# Patient Record
Sex: MALE | Race: WHITE | NOT HISPANIC OR LATINO | Employment: OTHER | URBAN - METROPOLITAN AREA
[De-identification: names, ages, dates, MRNs, and addresses within clinical notes are randomized per-mention and may not be internally consistent; named-entity substitution may affect disease eponyms.]

---

## 2017-02-02 ENCOUNTER — PRE VISIT (OUTPATIENT)
Dept: CARDIOLOGY | Facility: CLINIC | Age: 62
End: 2017-02-02

## 2017-02-21 ENCOUNTER — TELEPHONE (OUTPATIENT)
Dept: CARDIOLOGY | Facility: CLINIC | Age: 62
End: 2017-02-21

## 2017-02-21 ENCOUNTER — HOSPITAL ENCOUNTER (OUTPATIENT)
Dept: CARDIOLOGY | Facility: CLINIC | Age: 62
Discharge: HOME | End: 2017-02-21
Attending: INTERNAL MEDICINE | Admitting: INTERNAL MEDICINE
Payer: COMMERCIAL

## 2017-02-21 ENCOUNTER — OFFICE VISIT (OUTPATIENT)
Dept: CARDIOLOGY | Facility: CLINIC | Age: 62
End: 2017-02-21
Attending: INTERNAL MEDICINE
Payer: COMMERCIAL

## 2017-02-21 VITALS
DIASTOLIC BLOOD PRESSURE: 86 MMHG | WEIGHT: 203 LBS | BODY MASS INDEX: 27.5 KG/M2 | SYSTOLIC BLOOD PRESSURE: 132 MMHG | HEIGHT: 72 IN | HEART RATE: 68 BPM

## 2017-02-21 DIAGNOSIS — I05.9 MITRAL VALVE DISORDER: Primary | ICD-10-CM

## 2017-02-21 DIAGNOSIS — I48.0 PAROXYSMAL ATRIAL FIBRILLATION (H): ICD-10-CM

## 2017-02-21 DIAGNOSIS — I34.1 MVP (MITRAL VALVE PROLAPSE): ICD-10-CM

## 2017-02-21 DIAGNOSIS — I10 BENIGN ESSENTIAL HYPERTENSION: ICD-10-CM

## 2017-02-21 DIAGNOSIS — Q21.12 PFO (PATENT FORAMEN OVALE): ICD-10-CM

## 2017-02-21 DIAGNOSIS — I05.9 MITRAL VALVE DISORDER: ICD-10-CM

## 2017-02-21 LAB
ANION GAP SERPL CALCULATED.3IONS-SCNC: 12.2 MMOL/L (ref 6–17)
BUN SERPL-MCNC: 11 MG/DL (ref 7–30)
CALCIUM SERPL-MCNC: 9.3 MG/DL (ref 8.5–10.5)
CHLORIDE SERPL-SCNC: 105 MMOL/L (ref 98–107)
CO2 SERPL-SCNC: 31 MMOL/L (ref 23–29)
CREAT SERPL-MCNC: 1.22 MG/DL (ref 0.7–1.3)
GFR SERPL CREATININE-BSD FRML MDRD: 60 ML/MIN/1.7M2
GLUCOSE SERPL-MCNC: 89 MG/DL (ref 70–105)
POTASSIUM SERPL-SCNC: 4.2 MMOL/L (ref 3.5–5.1)
SODIUM SERPL-SCNC: 144 MMOL/L (ref 136–145)

## 2017-02-21 PROCEDURE — 36415 COLL VENOUS BLD VENIPUNCTURE: CPT | Performed by: INTERNAL MEDICINE

## 2017-02-21 PROCEDURE — 93306 TTE W/DOPPLER COMPLETE: CPT

## 2017-02-21 PROCEDURE — 80048 BASIC METABOLIC PNL TOTAL CA: CPT | Performed by: INTERNAL MEDICINE

## 2017-02-21 PROCEDURE — 99214 OFFICE O/P EST MOD 30 MIN: CPT | Performed by: INTERNAL MEDICINE

## 2017-02-21 PROCEDURE — 93306 TTE W/DOPPLER COMPLETE: CPT | Mod: 26 | Performed by: INTERNAL MEDICINE

## 2017-02-21 RX ORDER — LISINOPRIL 5 MG/1
5 TABLET ORAL DAILY
Qty: 90 TABLET | Refills: 3 | Status: SHIPPED | OUTPATIENT
Start: 2017-02-21 | End: 2018-03-05

## 2017-02-21 RX ORDER — METOPROLOL SUCCINATE 25 MG/1
25 TABLET, EXTENDED RELEASE ORAL DAILY
Qty: 90 TABLET | Refills: 3 | Status: SHIPPED | OUTPATIENT
Start: 2017-02-21 | End: 2018-03-05

## 2017-02-21 NOTE — LETTER
2/21/2017    Jose Riley MD  [address] [address]     RE: Nicholas FAVIAN Gongora       Dear Colleague,    I had the pleasure of seeing Nicholas Carrasquillofabian in the [facility].    Mr. Gongora is a very nice 62-year-old gentleman with past medical history significant for schizophrenia, mitral valve prolapse, ultimately leading to mitral valve repair with a St. Mars ring in 04/2001 by Dr. Tato Mckeon.  He presented with new onset atrial fibrillation.  In addition to his mitral valve repair with a 22 mm ring, he underwent bilateral maze procedure and a left atrial appendage removal and repair of a patent foramen ovale.      Mr. Gongora returns to clinic stating that he thinks he is doing quite well.  He has no chest, arm, neck, jaw or shoulder discomfort, no dyspnea on exertion, orthopnea or PND, no palpitations, lightheadedness, dizziness, syncope or near-syncope.  He notes no problems or side effects with his current medical regimen.      Over the last 2-3 years, he has done better as he was homeless prior to that and now he is living with his sister.  He now states he is getting his detention Social Security checks, and this is helping him out financially, although he states it is still only $877 a month.      Outpatient Encounter Prescriptions as of 2/21/2017   Medication Sig Dispense Refill     Aspirin-Caffeine (ANACIN PO) Take 800 mg by mouth 2 times daily       lisinopril (PRINIVIL/ZESTRIL) 5 MG tablet Take 1 tablet (5 mg) by mouth daily 90 tablet 3     metoprolol (TOPROL-XL) 25 MG 24 hr tablet Take 1 tablet (25 mg) by mouth daily 90 tablet 3     ziprasidone (GEODON) 80 MG capsule Take 80 mg by mouth daily       [DISCONTINUED] lisinopril (PRINIVIL/ZESTRIL) 5 MG tablet Take 1 tablet (5 mg) by mouth daily 90 tablet 0     [DISCONTINUED] metoprolol (TOPROL-XL) 25 MG 24 hr tablet Take 1 tablet (25 mg) by mouth daily 90 tablet 0      [DISCONTINUED] aspirin 81 MG tablet Take 81 mg by mouth daily       [DISCONTINUED] acetaminophen (TYLENOL) 500 MG tablet Take 500-1,000 mg by mouth every 6 hours as needed for mild pain       No facility-administered encounter medications on file as of 2/21/2017.      ASSESSMENT AND PLAN:  Nicholas appears to be doing quite well from a cardiac standpoint without clinical evidence of ischemia, heart failure or significant arrhythmia.      Echocardiogram shows his valve to be functioning well.  Mean gradient across the valve is only 2 mmHg.  His ascending aorta is mildly dilated.  His ejection fraction is 55%-60%.      To his knowledge, he has had no reoccurrence of atrial fibrillation.  Rhythm today in clinic appears to be quite regular with occasional premature beats.  His echocardiogram does not commit to what rhythm he is in.  I will review the echocardiogram.      I will continue his medical regimen as is.  Blood pressure is well controlled at 132/86 with a pulse of 68.      Review of the chart shows he has not had a basic metabolic profile checked in 4 years.  As he is on lisinopril, I will send off a basic metabolic profile and will follow up on that.  Otherwise, I will have him return in 1 year.  If he should have any problems, I would be glad to see him sooner.  Thank you for allowing me to participate in his care.     Again, thank you for allowing me to participate in the care of your patient.      Sincerely,    Vaughn Bello MD     [facility]

## 2017-02-21 NOTE — TELEPHONE ENCOUNTER
Per Patient okay to leave lab results message on answering machine. BMP done post OV today reviewed with Dr. Bello and patient called and message left. Patient will call back with any questions or concerns.

## 2017-02-21 NOTE — PROGRESS NOTES
HPI and Plan:   See dictation    Orders Placed This Encounter   Procedures     Basic metabolic panel     Basic metabolic panel     Basic metabolic panel     Follow-Up with Cardiac Advanced Practice Provider     Follow-Up with Cardiologist     Echocardiogram     Echocardiogram       Orders Placed This Encounter   Medications     Aspirin-Caffeine (ANACIN PO)     Sig: Take 800 mg by mouth 2 times daily     lisinopril (PRINIVIL/ZESTRIL) 5 MG tablet     Sig: Take 1 tablet (5 mg) by mouth daily     Dispense:  90 tablet     Refill:  3     metoprolol (TOPROL-XL) 25 MG 24 hr tablet     Sig: Take 1 tablet (25 mg) by mouth daily     Dispense:  90 tablet     Refill:  3       Medications Discontinued During This Encounter   Medication Reason     acetaminophen (TYLENOL) 500 MG tablet Stopped by Patient     aspirin 81 MG tablet Stopped by Patient     lisinopril (PRINIVIL/ZESTRIL) 5 MG tablet Reorder     metoprolol (TOPROL-XL) 25 MG 24 hr tablet Reorder         Encounter Diagnoses   Name Primary?     MVP (mitral valve prolapse)      Mitral valve disorder Yes     Benign essential hypertension        CURRENT MEDICATIONS:  Current Outpatient Prescriptions   Medication Sig Dispense Refill     Aspirin-Caffeine (ANACIN PO) Take 800 mg by mouth 2 times daily       lisinopril (PRINIVIL/ZESTRIL) 5 MG tablet Take 1 tablet (5 mg) by mouth daily 90 tablet 3     metoprolol (TOPROL-XL) 25 MG 24 hr tablet Take 1 tablet (25 mg) by mouth daily 90 tablet 3     ziprasidone (GEODON) 80 MG capsule Take 80 mg by mouth daily       [DISCONTINUED] lisinopril (PRINIVIL/ZESTRIL) 5 MG tablet Take 1 tablet (5 mg) by mouth daily 90 tablet 0     [DISCONTINUED] metoprolol (TOPROL-XL) 25 MG 24 hr tablet Take 1 tablet (25 mg) by mouth daily 90 tablet 0       ALLERGIES   No Known Allergies    PAST MEDICAL HISTORY:  Past Medical History   Diagnosis Date     Atrial fibrillation (aka AFIB)      s/p MAZE right and left, removal left atrial appendage 04/29/2011      Family history of early CAD      GERD (gastroesophageal reflux disease)      Hypertension      MVP (mitral valve prolapse)      s/p mitral valve repair, 32mm St. Mars ring 04/29/2011     Need for SBE (subacute bacterial endocarditis) prophylaxis      PFO (patent foramen ovale)      s/p PFO closure, 04/29/2011     Schizophrenia (H)      Sleep apnea        PAST SURGICAL HISTORY:  Past Surgical History   Procedure Laterality Date     Other surgical history  2011     s/p mitral valve repair, 32mm St. Mars ring 04/29/2011     Other surgical history  2011     s/p MAZE right and left, removal left atrial appendage 04/29/2011     Other surgical history  2011     s/p PFO closure, 04/29/2011     Other surgical history       esophageal surgery for GERD 1998     Other surgical history       laproscopic Nissen fundoplication, 1998     Tonsillectomy         FAMILY HISTORY:  Family History   Problem Relation Age of Onset     Myocardial Infarction Mother      Hypertension Mother      Myocardial Infarction Father        SOCIAL HISTORY:  Social History     Social History     Marital status: Single     Spouse name: N/A     Number of children: N/A     Years of education: N/A     Social History Main Topics     Smoking status: Former Smoker     Quit date: 2/19/1995     Smokeless tobacco: None     Alcohol use No     Drug use: No     Sexual activity: Not Asked     Other Topics Concern     Parent/Sibling W/ Cabg, Mi Or Angioplasty Before 65f 55m? No     Caffeine Concern Yes     soda     Special Diet No     Exercise Yes     daily, walking     Seat Belt Yes     Social History Narrative       Review of Systems:  Skin:  Negative       Eyes:  Positive for glasses    ENT:  Negative      Respiratory:  Negative       Cardiovascular:  Negative      Gastroenterology: Negative      Genitourinary:  Negative      Musculoskeletal:  Positive for neck pain;back pain    Neurologic:  Positive for headaches;numbness or tingling of feet;numbness or tingling of  hands    Psychiatric:  Positive for sleep disturbances restless sleep  Heme/Lymph/Imm:  Negative      Endocrine:  Negative        Physical Exam:  Vitals: /86  Pulse 68  Ht 1.829 m (6')  Wt 92.1 kg (203 lb)  BMI 27.53 kg/m2    Constitutional:  cooperative, alert and oriented, well developed, well nourished, in no acute distress        Skin:  warm and dry to the touch, no apparent skin lesions or masses noted        Head:  normocephalic, no masses or lesions        Eyes:  pupils equal and round, conjunctivae and lids unremarkable, sclera white, no xanthalasma, EOMS intact, no nystagmus        ENT:  no pallor or cyanosis, dentition good        Neck:  carotid pulses are full and equal bilaterally;no carotid bruit        Chest:             Cardiac: regular rhythm;no murmurs, gallops or rubs detected frequent premature beats                Abdomen:           Vascular: pulses full and equal                                        Extremities and Back:  no edema;no spinal abnormalities noted;normal muscle strength and tone         wears compresson stockings    Neurological:  affect appropriate, oriented to time, person and place;no gross motor deficits              CC  Vaughn Bello MD  [facility]  [address]  [place] [address]

## 2017-02-21 NOTE — MR AVS SNAPSHOT
After Visit Summary   2/21/2017    Nicholas Gongora    MRN: [id number]           Patient Information     Date Of Birth          1955        Visit Information        Provider Department      2/21/2017 1:45 PM Vaughn Bello MD [facility]        Today's Diagnoses     Mitral valve disorder    -  1    MVP (mitral valve prolapse)        Benign essential hypertension        Paroxysmal atrial fibrillation (H)        PFO (patent foramen ovale)           Follow-ups after your visit        Additional Services     Follow-Up with Cardiac Advanced Practice Provider           Follow-Up with Cardiologist                 Future tests that were ordered for you today     Open Future Orders        Priority Expected Expires Ordered    EKG 12-lead complete w/read - Clinics (to be scheduled) Routine 2/21/2018 3/28/2018 2/21/2017    Basic metabolic panel Routine 2/21/2018 3/28/2018 2/21/2017    Echocardiogram Routine 2/21/2018 3/28/2018 2/21/2017    Follow-Up with Cardiac Advanced Practice Provider Routine 2/21/2018 7/6/2018 2/21/2017    Basic metabolic panel Routine 2/21/2019 3/13/2019 2/21/2017    Follow-Up with Cardiologist Routine 2/21/2019 3/13/2019 2/21/2017    Echocardiogram Routine 2/21/2019 3/13/2019 2/21/2017            Who to contact     If you have questions or need follow up information about today's clinic visit or your schedule please contact [facility] [place] directly at [phone].  Normal or non-critical lab and imaging results will be communicated to you by MyChart, letter or phone within 4 business days after the clinic has received the results. If you do not hear from us within 7 days, please contact the clinic through MyChart or phone. If you have a critical or abnormal lab result, we will notify you by phone as soon as possible.  Submit refill requests through Lessonwriter or call your pharmacy and they will forward the  "refill request to us. Please allow 3 business days for your refill to be completed.          Additional Information About Your Visit        MyChart Information     CiiNOWt lets you send messages to your doctor, view your test results, renew your prescriptions, schedule appointments and more. To sign up, go to [email or web address] . Click on \"Log in\" on the left side of the screen, which will take you to the Welcome page. Then click on \"Sign up Now\" on the right side of the page.     You will be asked to enter the access code listed below, as well as some personal information. Please follow the directions to create your username and password.     Your access code is: [email or web address]  Expires: 2017  2:35 PM     Your access code will  in 90 days. If you need help or a new code, please call your [place] clinic or [phone].        Care EveryWhere ID     This is your Care EveryWhere ID. This could be used by other organizations to access your [place] medical records  [id number]        Your Vitals Were     Pulse Height BMI (Body Mass Index)             68 1.829 m (6') 27.53 kg/m2          Blood Pressure from Last 3 Encounters:   17 132/86   16 120/84   02/19/15 124/68    Weight from Last 3 Encounters:   17 92.1 kg (203 lb)   16 89.1 kg (196 lb 6.4 oz)   02/19/15 84.6 kg (186 lb 6.4 oz)              We Performed the Following     Follow-Up with Cardiologist          Where to get your medicines      These medications were sent to [id number] [place], [place] - [address]  [address] [address]    Hours:  Closed 10-16-08 business to [facility] Phone:  [phone]     lisinopril 5 MG tablet    metoprolol 25 MG 24 hr tablet          Primary Care Provider Office Phone # Fax #    Jose Riley [phone]       [address] [address]        Thank you!     Thank you for " choosing [facility] [place]  for your care. Our goal is always to provide you with excellent care. Hearing back from our patients is one way we can continue to improve our services. Please take a few minutes to complete the written survey that you may receive in the mail after your visit with us. Thank you!             Your Updated Medication List - Protect others around you: Learn how to safely use, store and throw away your medicines at www.disposemymeds.org.          This list is accurate as of: 2/21/17  2:35 PM.  Always use your most recent med list.                   Brand Name Dispense Instructions for use    ANACIN PO      Take 800 mg by mouth 2 times daily       GEODON 80 MG capsule   Generic drug:  ziprasidone      Take 80 mg by mouth daily       lisinopril 5 MG tablet    PRINIVIL/ZESTRIL    90 tablet    Take 1 tablet (5 mg) by mouth daily       metoprolol 25 MG 24 hr tablet    TOPROL-XL    90 tablet    Take 1 tablet (25 mg) by mouth daily

## 2017-02-22 NOTE — PROGRESS NOTES
HISTORY OF PRESENT ILLNESS:  Mr. Gongora is a very nice 62-year-old gentleman with past medical history significant for schizophrenia, mitral valve prolapse, ultimately leading to mitral valve repair with a St. Mars ring in 04/2001 by Dr. Tato Mckeon.  He presented with new onset atrial fibrillation.  In addition to his mitral valve repair with a 22 mm ring, he underwent bilateral maze procedure and a left atrial appendage removal and repair of a patent foramen ovale.      Mr. Gongora returns to clinic stating that he thinks he is doing quite well.  He has no chest, arm, neck, jaw or shoulder discomfort, no dyspnea on exertion, orthopnea or PND, no palpitations, lightheadedness, dizziness, syncope or near-syncope.  He notes no problems or side effects with his current medical regimen.      Over the last 2-3 years, he has done better as he was homeless prior to that and now he is living with his sister.  He now states he is getting his alf Social Security checks, and this is helping him out financially, although he states it is still only $877 a month.      ASSESSMENT AND PLAN:  Nicholas appears to be doing quite well from a cardiac standpoint without clinical evidence of ischemia, heart failure or significant arrhythmia.      Echocardiogram shows his valve to be functioning well.  Mean gradient across the valve is only 2 mmHg.  His ascending aorta is mildly dilated.  His ejection fraction is 55%-60%.      To his knowledge, he has had no reoccurrence of atrial fibrillation.  Rhythm today in clinic appears to be quite regular with occasional premature beats.  His echocardiogram does not commit to what rhythm he is in.  I will review the echocardiogram.      I will continue his medical regimen as is.  Blood pressure is well controlled at 132/86 with a pulse of 68.      Review of the chart shows he has not had a basic metabolic profile checked in 4 years.  As he is on lisinopril, I will send off a basic metabolic profile  and will follow up on that.  Otherwise, I will have him return in 1 year.  If he should have any problems, I would be glad to see him sooner.  Thank you for allowing me to participate in his care.   Vaughn Zhang MD, [facility]         VAUGHN ZHANG MD, [facility]             D: 2017 14:33   T: 2017 21:12   MT: BILL      Name:     TETO DYSON   MRN:      [id number]        Account:      [id number]   :      1955           Service Date: 2017      Document: [id number]

## 2017-11-09 ENCOUNTER — TRANSFERRED RECORDS (OUTPATIENT)
Dept: HEALTH INFORMATION MANAGEMENT | Facility: CLINIC | Age: 62
End: 2017-11-09
Payer: COMMERCIAL

## 2017-11-09 LAB
ALBUMIN SERPL-MCNC: NORMAL G/DL
ALP SERPL-CCNC: 65 U/L
ALT SERPL-CCNC: 27 U/L
ANION GAP SERPL CALCULATED.3IONS-SCNC: 8 MMOL/L
AST SERPL-CCNC: 25 U/L
BILIRUB SERPL-MCNC: 0.3 MG/DL
BUN SERPL-MCNC: 13 MG/DL
CALCIUM SERPL-MCNC: 8.8 MG/DL
CHLORIDE SERPLBLD-SCNC: 111 MMOL/L
CO2 SERPL-SCNC: 25 MMOL/L
CREAT SERPL-MCNC: 1 MG/DL
GFR SERPL CREATININE-BSD FRML MDRD: >60 ML/MIN/1.73M2
GLUCOSE SERPL-MCNC: 99 MG/DL (ref 70–99)
POTASSIUM SERPL-SCNC: 3.9 MMOL/L
PROT SERPL-MCNC: NORMAL G/DL
SODIUM SERPL-SCNC: 144 MMOL/L
TSH SERPL-ACNC: 1.35 MCU/ML

## 2017-11-20 ENCOUNTER — TRANSFERRED RECORDS (OUTPATIENT)
Dept: HEALTH INFORMATION MANAGEMENT | Facility: CLINIC | Age: 62
End: 2017-11-20

## 2017-11-20 LAB
BASOPHILS NFR BLD AUTO: 0.4 %
EOSINOPHIL NFR BLD AUTO: 0.1 %
ERYTHROCYTE [DISTWIDTH] IN BLOOD BY AUTOMATED COUNT: 14.6 %
HCT VFR BLD AUTO: 29 %
HEMOGLOBIN: 9 G/DL (ref 13.3–17.7)
LYMPHOCYTES NFR BLD AUTO: 12.5 %
MCH RBC QN AUTO: 28.7 PG
MCHC RBC AUTO-ENTMCNC: 31 G/DL
MCV RBC AUTO: 92.4 FL
MONOCYTES NFR BLD AUTO: 7.4 %
NEUTROPHILS NFR BLD AUTO: ABNORMAL %
PLATELET COUNT - QUEST: 450 10^9/L (ref 150–450)
RBC # BLD AUTO: 3.14 10^12/L
WBC # BLD AUTO: 7.7 10^9/L

## 2017-11-28 ENCOUNTER — TRANSFERRED RECORDS (OUTPATIENT)
Dept: HEALTH INFORMATION MANAGEMENT | Facility: CLINIC | Age: 62
End: 2017-11-28

## 2017-11-29 ENCOUNTER — DOCUMENTATION ONLY (OUTPATIENT)
Dept: CARDIOLOGY | Facility: CLINIC | Age: 62
End: 2017-11-29

## 2018-02-09 ENCOUNTER — TRANSFERRED RECORDS (OUTPATIENT)
Dept: HEALTH INFORMATION MANAGEMENT | Facility: CLINIC | Age: 63
End: 2018-02-09

## 2018-03-05 ENCOUNTER — OFFICE VISIT (OUTPATIENT)
Dept: CARDIOLOGY | Facility: CLINIC | Age: 63
End: 2018-03-05
Attending: INTERNAL MEDICINE
Payer: COMMERCIAL

## 2018-03-05 ENCOUNTER — CARE COORDINATION (OUTPATIENT)
Dept: CARDIOLOGY | Facility: CLINIC | Age: 63
End: 2018-03-05

## 2018-03-05 ENCOUNTER — HOSPITAL ENCOUNTER (OUTPATIENT)
Dept: CARDIOLOGY | Facility: CLINIC | Age: 63
Discharge: HOME | End: 2018-03-05
Attending: INTERNAL MEDICINE | Admitting: INTERNAL MEDICINE
Payer: COMMERCIAL

## 2018-03-05 VITALS
HEIGHT: 72 IN | SYSTOLIC BLOOD PRESSURE: 143 MMHG | DIASTOLIC BLOOD PRESSURE: 85 MMHG | WEIGHT: 191 LBS | BODY MASS INDEX: 25.87 KG/M2 | HEART RATE: 56 BPM

## 2018-03-05 DIAGNOSIS — I34.1 MVP (MITRAL VALVE PROLAPSE): ICD-10-CM

## 2018-03-05 DIAGNOSIS — I10 HYPERTENSION, ESSENTIAL: Primary | ICD-10-CM

## 2018-03-05 DIAGNOSIS — I48.0 PAROXYSMAL ATRIAL FIBRILLATION (H): ICD-10-CM

## 2018-03-05 DIAGNOSIS — I05.9 MITRAL VALVE DISEASE: ICD-10-CM

## 2018-03-05 LAB
ANION GAP SERPL CALCULATED.3IONS-SCNC: 10.1 MMOL/L (ref 6–17)
BUN SERPL-MCNC: 9 MG/DL (ref 7–30)
CALCIUM SERPL-MCNC: 9.6 MG/DL (ref 8.5–10.5)
CHLORIDE SERPL-SCNC: 105 MMOL/L (ref 98–107)
CO2 SERPL-SCNC: 31 MMOL/L (ref 23–29)
CREAT SERPL-MCNC: 1.17 MG/DL (ref 0.7–1.3)
GFR SERPL CREATININE-BSD FRML MDRD: 63 ML/MIN/1.7M2
GLUCOSE SERPL-MCNC: 68 MG/DL (ref 70–105)
POTASSIUM SERPL-SCNC: 4.1 MMOL/L (ref 3.5–5.1)
SODIUM SERPL-SCNC: 142 MMOL/L (ref 136–145)

## 2018-03-05 PROCEDURE — 36415 COLL VENOUS BLD VENIPUNCTURE: CPT | Performed by: INTERNAL MEDICINE

## 2018-03-05 PROCEDURE — 99214 OFFICE O/P EST MOD 30 MIN: CPT | Mod: 25 | Performed by: NURSE PRACTITIONER

## 2018-03-05 PROCEDURE — 93306 TTE W/DOPPLER COMPLETE: CPT

## 2018-03-05 PROCEDURE — 93000 ELECTROCARDIOGRAM COMPLETE: CPT | Performed by: NURSE PRACTITIONER

## 2018-03-05 PROCEDURE — 93306 TTE W/DOPPLER COMPLETE: CPT | Mod: 26 | Performed by: INTERNAL MEDICINE

## 2018-03-05 PROCEDURE — 80048 BASIC METABOLIC PNL TOTAL CA: CPT | Performed by: INTERNAL MEDICINE

## 2018-03-05 RX ORDER — METOPROLOL SUCCINATE 25 MG/1
25 TABLET, EXTENDED RELEASE ORAL DAILY
Qty: 90 TABLET | Refills: 3 | Status: SHIPPED | OUTPATIENT
Start: 2018-03-05 | End: 2019-03-05

## 2018-03-05 RX ORDER — LISINOPRIL 10 MG/1
10 TABLET ORAL DAILY
Qty: 90 TABLET | Refills: 1 | Status: SHIPPED | OUTPATIENT
Start: 2018-03-05 | End: 2018-08-23

## 2018-03-05 NOTE — PROGRESS NOTES
Records received from [facility].  Labs entered and record sent to HIM for scanning.

## 2018-03-05 NOTE — MR AVS SNAPSHOT
After Visit Summary   3/5/2018    Nicholas Gongora    MRN: [id number]           Patient Information     Date Of Birth          1955        Visit Information        Provider Department      3/5/2018 11:00 AM Jackie Decker, [facility]        Today's Diagnoses     Hypertension, essential    -  1    Mitral valve disease        MVP (mitral valve prolapse)          Care Instructions    For better blood pressure control- Increase the lisinopril to 10 mg once a day.    See you back in 1month          Follow-ups after your visit        Additional Services     Follow-Up with Cardiac Advanced Practice Provider                 Future tests that were ordered for you today     Open Future Orders        Priority Expected Expires Ordered    Basic metabolic panel Routine 4/4/2018 3/5/2019 3/5/2018    Follow-Up with Cardiac Advanced Practice Provider Routine 4/4/2018 3/5/2019 3/5/2018            Who to contact     If you have questions or need follow up information about today's clinic visit or your schedule please contact [facility] directly at [phone].  Normal or non-critical lab and imaging results will be communicated to you by JourneyPurehart, letter or phone within 4 business days after the clinic has received the results. If you do not hear from us within 7 days, please contact the clinic through JourneyPurehart or phone. If you have a critical or abnormal lab result, we will notify you by phone as soon as possible.  Submit refill requests through Sportilia or call your pharmacy and they will forward the refill request to us. Please allow 3 business days for your refill to be completed.          Additional Information About Your Visit        MyChart Information     Sportilia lets you send messages to your doctor, view your test results, renew your prescriptions, schedule appointments and more. To sign up, go to [email or web address] .  "Click on \"Log in\" on the left side of the screen, which will take you to the Welcome page. Then click on \"Sign up Now\" on the right side of the page.     You will be asked to enter the access code listed below, as well as some personal information. Please follow the directions to create your username and password.     Your access code is: [email or web address]  Expires: 6/3/2018 11:30 AM     Your access code will  in 90 days. If you need help or a new code, please call your [place] clinic or [phone].        Care EveryWhere ID     This is your Care EveryWhere ID. This could be used by other organizations to access your [place] medical records  [id number]        Your Vitals Were     Pulse Height BMI (Body Mass Index)             56 1.829 m (6') 25.9 kg/m2          Blood Pressure from Last 3 Encounters:   18 143/85   17 132/86   16 120/84    Weight from Last 3 Encounters:   18 86.6 kg (191 lb)   17 92.1 kg (203 lb)   16 89.1 kg (196 lb 6.4 oz)              We Performed the Following     EKG 12-lead complete w/read - Clinics (performed today)     Follow-Up with Cardiac Advanced Practice Provider          Today's Medication Changes          These changes are accurate as of 3/5/18 11:30 AM.  If you have any questions, ask your nurse or doctor.               These medicines have changed or have updated prescriptions.        Dose/Directions    lisinopril 10 MG tablet   Commonly known as:  PRINIVIL/ZESTRIL   This may have changed:    - medication strength  - how much to take   Used for:  Mitral valve disease, Hypertension, essential   Changed by:  Jackie Decker, APRN CNP        Dose:  10 mg   Take 1 tablet (10 mg) by mouth daily   Quantity:  90 tablet   Refills:  1            Where to get your medicines      These medications were sent to [id number] - [place], [place] - [address]  [address], [place] [address]    Hours:  Same info as [facility] Phone:  " [phone]     lisinopril 10 MG tablet    metoprolol succinate 25 MG 24 hr tablet                Primary Care Provider Office Phone # Fax #    Adamfrederic Frankie Riley [phone]       [address] [address]        Equal Access to Services     DIONNE MIR : Hadii aad ku hadasho Soomaali, waaxda luqadaha, qaybta kaalmada adeegyada, waxay idiin hayaan adeeg khdarlingsh lalavellen . So [facility] [phone].    ATENCIÓN: Si habla español, tiene a riley disposición servicios gratuitos de asistencia lingüística. Georgiana al [phone].    We comply with applicable federal civil rights laws and [place] laws. We do not discriminate on the basis of race, color, national origin, age, disability, sex, sexual orientation, or gender identity.            Thank you!     Thank you for choosing [facility]  for your care. Our goal is always to provide you with excellent care. Hearing back from our patients is one way we can continue to improve our services. Please take a few minutes to complete the written survey that you may receive in the mail after your visit with us. Thank you!             Your Updated Medication List - Protect others around you: Learn how to safely use, store and throw away your medicines at www.disposemymeds.org.          This list is accurate as of 3/5/18 11:30 AM.  Always use your most recent med list.                   Brand Name Dispense Instructions for use Diagnosis    acetaminophen 500 MG Caps           ASPIR-81 PO           GEODON 80 MG capsule   Generic drug:  ziprasidone      Take 80 mg by mouth daily        IRON (FERROUS GLUCONATE) PO      Take by mouth 3 times daily        lisinopril 10 MG tablet    PRINIVIL/ZESTRIL    90 tablet    Take 1 tablet (10 mg) by mouth daily    Mitral valve disease, Hypertension, essential       metoprolol succinate 25 MG 24 hr tablet    TOPROL-XL    90 tablet    Take 1 tablet (25  mg) by mouth daily    MVP (mitral valve prolapse)

## 2018-03-05 NOTE — LETTER
3/5/2018    Jose Riley MD  [address] [address]    RE: Nicholas Carrasquillofabian       Dear Colleague,    I had the pleasure of seeing Nicholas Gongora in the [facility].    Primary Provider: Jose Riley   Primary Cardiology: Dr. Bello    REASON FOR VISIT/CHIEF COMPLAINT: Mitral Valve disease    HISTORY OF PRESENT ILLNESS:  Mr. Gongora is a very nice 63-year-old gentleman with past medical history significant for schizophrenia, mitral valve prolapse, ultimately leading to mitral valve repair with a [place] Mars ring in 04/2001 by Dr. Tato Mckeon.  He presented with new onset atrial fibrillation.  In addition to his mitral valve repair with a 22 mm ring, he underwent bilateral maze procedure and a left atrial appendage removal and repair of a patent foramen ovale.       Mr. Gongora returns to clinic stating that he thinks he is doing quite well. [facility] He went there when he passed out due to dehydration from loose frequent stools. According to Nicholas, they have found no source of bleeding. He has gone through an Endoscopy and Colonoscopy. He has another follow-up Colonoscopy in April.    Fortunately for Nicholas, [facility]. He was homeless prior to that. He now lives in an apartment.     Today he He has no chest, arm, neck, jaw or shoulder discomfort, no dyspnea on exertion, orthopnea or PND, no palpitations, lightheadedness, dizziness, syncope or near-syncope.  He notes no problems or side effects with his current medical regimen.       He reports his systolic blood pressure has been in the 120s [facility]. Today his blood pressure here was 143/56, it was checked twice    Echocardiogram shows his valve to be functioning well. There is trace mitral regurgitation.  Mean gradient across the valve is  4 mmHg.  His ascending  aorta is mildly dilated.  His ejection fraction is 55%-60%.     EKG today demonstrated SB with rate of 55 bpm.    Lab work reviewed today: normal results.        ASSESSMENT AND PLAN:  Nicholas appears to be doing quite well from a cardiac standpoint without clinical evidence of ischemia, heart failure or significant arrhythmia.        1. History of Atrial Fib: To his knowledge, he has had no reoccurrence of atrial fibrillation. I will continue his medical regimen as is.    2. Blood pressure is elevated today, will increase the lisinopril to 10 mg once a day. Return visit in 1 month with BMP prior to visit.  3. History of mitral valve prolapse-s/p mitral valve repair-yearly echos.      He will follow-up with Dr. Bello in one year.               Results for orders placed or performed in visit on 03/05/18 (from the past 24 hour(s))   Basic metabolic panel   Result Value Ref Range    Sodium 142 136 - 145 mmol/L    Potassium 4.1 3.5 - 5.1 mmol/L    Chloride 105 98 - 107 mmol/L    Carbon Dioxide 31 (H) 23 - 29 mmol/L    Anion Gap 10.1 6 - 17 mmol/L    Glucose 68 (L) 70 - 105 mg/dL    Urea Nitrogen 9 7 - 30 mg/dL    Creatinine 1.17 0.70 - 1.30 mg/dL    GFR Estimate 63 >60 mL/min/1.7m2    GFR Estimate If Black 76 >60 mL/min/1.7m2    Calcium 9.6 8.5 - 10.5 mg/dL         Thank you for allowing me to participate in Nicholas's care.    .      Jackie Decker. APRN, CNP  [facility]    Orders Placed This Encounter   Procedures     Basic metabolic panel     Follow-Up with Cardiac Advanced Practice Provider     EKG 12-lead complete w/read - Clinics (performed today)       Orders Placed This Encounter   Medications     Aspirin (ASPIR-81 PO)     acetaminophen 500 MG CAPS     IRON, FERROUS GLUCONATE, PO     Sig: Take by mouth 3 times daily      lisinopril (PRINIVIL/ZESTRIL) 10 MG tablet     Sig: Take 1 tablet (10 mg) by mouth daily     Dispense:  90 tablet     Refill:  1     metoprolol succinate (TOPROL-XL)  25 MG 24 hr tablet     Sig: Take 1 tablet (25 mg) by mouth daily     Dispense:  90 tablet     Refill:  3       Medications Discontinued During This Encounter   Medication Reason     Aspirin-Caffeine (ANACIN PO) Stopped by Patient     lisinopril (PRINIVIL/ZESTRIL) 5 MG tablet Reorder     metoprolol (TOPROL-XL) 25 MG 24 hr tablet Reorder         Encounter Diagnoses   Name Primary?     Mitral valve disease      Hypertension, essential Yes     MVP (mitral valve prolapse)        CURRENT MEDICATIONS:  Current Outpatient Prescriptions   Medication Sig Dispense Refill     Aspirin (ASPIR-81 PO)        acetaminophen 500 MG CAPS        IRON, FERROUS GLUCONATE, PO Take by mouth 3 times daily        lisinopril (PRINIVIL/ZESTRIL) 10 MG tablet Take 1 tablet (10 mg) by mouth daily 90 tablet 1     metoprolol succinate (TOPROL-XL) 25 MG 24 hr tablet Take 1 tablet (25 mg) by mouth daily 90 tablet 3     ziprasidone (GEODON) 80 MG capsule Take 80 mg by mouth daily       [DISCONTINUED] lisinopril (PRINIVIL/ZESTRIL) 5 MG tablet Take 1 tablet (5 mg) by mouth daily 90 tablet 3     [DISCONTINUED] metoprolol (TOPROL-XL) 25 MG 24 hr tablet Take 1 tablet (25 mg) by mouth daily 90 tablet 3       ALLERGIES   No Known Allergies    PAST MEDICAL HISTORY:  Past Medical History:   Diagnosis Date     Atrial fibrillation (aka AFIB)     s/p MAZE right and left, removal left atrial appendage 04/29/2011     Family history of early CAD      GERD (gastroesophageal reflux disease)      Hypertension      MVP (mitral valve prolapse)     s/p mitral valve repair, 32mm St[place] Mars ring 04/29/2011     Need for SBE (subacute bacterial endocarditis) prophylaxis      PFO (patent foramen ovale)     s/p PFO closure, 04/29/2011     Schizophrenia (H)      Sleep apnea        PAST SURGICAL HISTORY:  Past Surgical History:   Procedure Laterality Date     OTHER SURGICAL HISTORY  2011    s/p mitral valve repair, 32mm St[place] Mars ring 04/29/2011     OTHER SURGICAL HISTORY  2011     s/p MAZE right and left, removal left atrial appendage 04/29/2011     OTHER SURGICAL HISTORY  2011    s/p PFO closure, 04/29/2011     OTHER SURGICAL HISTORY      esophageal surgery for GERD 1998     OTHER SURGICAL HISTORY      laproscopic Nissen fundoplication, 1998     TONSILLECTOMY         FAMILY HISTORY:  Family History   Problem Relation Age of Onset     Myocardial Infarction Mother      Hypertension Mother      Myocardial Infarction Father        SOCIAL HISTORY:  Social History     Social History     Marital status: Single     Spouse name: N/A     Number of children: N/A     Years of education: N/A     Social History Main Topics     Smoking status: Former Smoker     Quit date: 2/19/1995     Smokeless tobacco: Never Used     Alcohol use No     Drug use: No     Sexual activity: Not Asked     Other Topics Concern     Parent/Sibling W/ Cabg, Mi Or Angioplasty Before 65f 55m? No     Caffeine Concern Yes     soda     Special Diet No     Exercise Yes     daily, walking     Seat Belt Yes     Social History Narrative       Review of Systems:  Skin:  Negative       Eyes:  Positive for glasses    ENT:  Negative      Respiratory:  Negative       Cardiovascular:  Negative      Gastroenterology: Negative      Genitourinary:  Negative      Musculoskeletal:  Positive for back pain;neck pain    Neurologic:  Positive for numbness or tingling of feet    Psychiatric:  Positive for sleep disturbances    Heme/Lymph/Imm:  Negative      Endocrine:  Negative        Physical Exam:  Vitals: /85  Pulse 56  Ht 1.829 m (6')  Wt 86.6 kg (191 lb)  BMI 25.9 kg/m2    Constitutional:  cooperative, alert and oriented, well developed, well nourished, in no acute distress thin      Skin:  warm and dry to the touch, no apparent skin lesions or masses noted          Head:  normocephalic, no masses or lesions        Eyes:  pupils equal and round, conjunctivae and lids unremarkable, sclera white, no xanthalasma, EOMS intact, no nystagmus         Lymph:      ENT:  no pallor or cyanosis, dentition good        Neck:  carotid pulses are full and equal bilaterally;no carotid bruit        Respiratory:  clear to auscultation;normal symmetry         Cardiac: regular rhythm;no murmurs, gallops or rubs detected frequent premature beats              pulses full and equal                                        GI:           Extremities and Muscular Skeletal:  no edema;no spinal abnormalities noted;normal muscle strength and tone              Neurological:  no gross motor deficits        Psych:  Alert and Oriented x 3        Thank you for allowing me to participate in the care of your patient.    Sincerely,     NESTOR Magaña [facility]

## 2018-03-05 NOTE — PROGRESS NOTES
Primary Provider: Jose Riley   Primary Cardiology: Dr. Bello    REASON FOR VISIT/CHIEF COMPLAINT: Mitral Valve disease    HISTORY OF PRESENT ILLNESS:  Mr. Gongora is a very nice 63-year-old gentleman with past medical history significant for schizophrenia, mitral valve prolapse, ultimately leading to mitral valve repair with a [place]. Mars ring in 04/2001 by Dr. Tato Mckeon.  He presented with new onset atrial fibrillation.  In addition to his mitral valve repair with a 22 mm ring, he underwent bilateral maze procedure and a left atrial appendage removal and repair of a patent foramen ovale.       Mr. Gongora returns to clinic stating that he thinks he is doing quite well. [facility] He went there when he passed out due to dehydration from loose frequent stools. According to Nicholas, they have found no source of bleeding. He has gone through an Endoscopy and Colonoscopy. He has another follow-up Colonoscopy in April.    Fortunately for Nicholas, [facility], [facility]. He was homeless prior to that. He now lives in an apartment.     Today he He has no chest, arm, neck, jaw or shoulder discomfort, no dyspnea on exertion, orthopnea or PND, no palpitations, lightheadedness, dizziness, syncope or near-syncope.  He notes no problems or side effects with his current medical regimen.       He reports his systolic blood pressure has been in the 120s [facility]. Today his blood pressure here was 143/56, it was checked twice    Echocardiogram shows his valve to be functioning well. There is trace mitral regurgitation.  Mean gradient across the valve is  4 mmHg.  His ascending aorta is mildly dilated.  His ejection fraction is 55%-60%.     EKG today demonstrated SB with rate of 55 bpm.    Lab work reviewed today: normal results.        ASSESSMENT AND PLAN:  Nicholas appears to be doing quite well from a cardiac standpoint without  clinical evidence of ischemia, heart failure or significant arrhythmia.        1. History of Atrial Fib: To his knowledge, he has had no reoccurrence of atrial fibrillation. I will continue his medical regimen as is.    2. Blood pressure is elevated today, will increase the lisinopril to 10 mg once a day. Return visit in 1 month with BMP prior to visit.  3. History of mitral valve prolapse-s/p mitral valve repair-yearly echos.      He will follow-up with Dr. Bello in one year.               Results for orders placed or performed in visit on 03/05/18 (from the past 24 hour(s))   Basic metabolic panel   Result Value Ref Range    Sodium 142 136 - 145 mmol/L    Potassium 4.1 3.5 - 5.1 mmol/L    Chloride 105 98 - 107 mmol/L    Carbon Dioxide 31 (H) 23 - 29 mmol/L    Anion Gap 10.1 6 - 17 mmol/L    Glucose 68 (L) 70 - 105 mg/dL    Urea Nitrogen 9 7 - 30 mg/dL    Creatinine 1.17 0.70 - 1.30 mg/dL    GFR Estimate 63 >60 mL/min/1.7m2    GFR Estimate If Black 76 >60 mL/min/1.7m2    Calcium 9.6 8.5 - 10.5 mg/dL         Thank you for allowing me to participate in Nicholas's care.    .      Jackie Decker. APRN, CNP  [facility]    Orders Placed This Encounter   Procedures     Basic metabolic panel     Follow-Up with Cardiac Advanced Practice Provider     EKG 12-lead complete w/read - Clinics (performed today)       Orders Placed This Encounter   Medications     Aspirin (ASPIR-81 PO)     acetaminophen 500 MG CAPS     IRON, FERROUS GLUCONATE, PO     Sig: Take by mouth 3 times daily      lisinopril (PRINIVIL/ZESTRIL) 10 MG tablet     Sig: Take 1 tablet (10 mg) by mouth daily     Dispense:  90 tablet     Refill:  1     metoprolol succinate (TOPROL-XL) 25 MG 24 hr tablet     Sig: Take 1 tablet (25 mg) by mouth daily     Dispense:  90 tablet     Refill:  3       Medications Discontinued During This Encounter   Medication Reason     Aspirin-Caffeine (ANACIN PO) Stopped by Patient     lisinopril  (PRINIVIL/ZESTRIL) 5 MG tablet Reorder     metoprolol (TOPROL-XL) 25 MG 24 hr tablet Reorder         Encounter Diagnoses   Name Primary?     Mitral valve disease      Hypertension, essential Yes     MVP (mitral valve prolapse)        CURRENT MEDICATIONS:  Current Outpatient Prescriptions   Medication Sig Dispense Refill     Aspirin (ASPIR-81 PO)        acetaminophen 500 MG CAPS        IRON, FERROUS GLUCONATE, PO Take by mouth 3 times daily        lisinopril (PRINIVIL/ZESTRIL) 10 MG tablet Take 1 tablet (10 mg) by mouth daily 90 tablet 1     metoprolol succinate (TOPROL-XL) 25 MG 24 hr tablet Take 1 tablet (25 mg) by mouth daily 90 tablet 3     ziprasidone (GEODON) 80 MG capsule Take 80 mg by mouth daily       [DISCONTINUED] lisinopril (PRINIVIL/ZESTRIL) 5 MG tablet Take 1 tablet (5 mg) by mouth daily 90 tablet 3     [DISCONTINUED] metoprolol (TOPROL-XL) 25 MG 24 hr tablet Take 1 tablet (25 mg) by mouth daily 90 tablet 3       ALLERGIES   No Known Allergies    PAST MEDICAL HISTORY:  Past Medical History:   Diagnosis Date     Atrial fibrillation (aka AFIB)     s/p MAZE right and left, removal left atrial appendage 04/29/2011     Family history of early CAD      GERD (gastroesophageal reflux disease)      Hypertension      MVP (mitral valve prolapse)     s/p mitral valve repair, 32mm St. Mars ring 04/29/2011     Need for SBE (subacute bacterial endocarditis) prophylaxis      PFO (patent foramen ovale)     s/p PFO closure, 04/29/2011     Schizophrenia (H)      Sleep apnea        PAST SURGICAL HISTORY:  Past Surgical History:   Procedure Laterality Date     OTHER SURGICAL HISTORY  2011    s/p mitral valve repair, 32mm St[place] Mars ring 04/29/2011     OTHER SURGICAL HISTORY  2011    s/p MAZE right and left, removal left atrial appendage 04/29/2011     OTHER SURGICAL HISTORY  2011    s/p PFO closure, 04/29/2011     OTHER SURGICAL HISTORY      esophageal surgery for GERD 1998     OTHER SURGICAL HISTORY      laproscopic Nissen  fundoplication, 1998     TONSILLECTOMY         FAMILY HISTORY:  Family History   Problem Relation Age of Onset     Myocardial Infarction Mother      Hypertension Mother      Myocardial Infarction Father        SOCIAL HISTORY:  Social History     Social History     Marital status: Single     Spouse name: N/A     Number of children: N/A     Years of education: N/A     Social History Main Topics     Smoking status: Former Smoker     Quit date: 2/19/1995     Smokeless tobacco: Never Used     Alcohol use No     Drug use: No     Sexual activity: Not Asked     Other Topics Concern     Parent/Sibling W/ Cabg, Mi Or Angioplasty Before 65f 55m? No     Caffeine Concern Yes     soda     Special Diet No     Exercise Yes     daily, walking     Seat Belt Yes     Social History Narrative       Review of Systems:  Skin:  Negative       Eyes:  Positive for glasses    ENT:  Negative      Respiratory:  Negative       Cardiovascular:  Negative      Gastroenterology: Negative      Genitourinary:  Negative      Musculoskeletal:  Positive for back pain;neck pain    Neurologic:  Positive for numbness or tingling of feet    Psychiatric:  Positive for sleep disturbances    Heme/Lymph/Imm:  Negative      Endocrine:  Negative        Physical Exam:  Vitals: /85  Pulse 56  Ht 1.829 m (6')  Wt 86.6 kg (191 lb)  BMI 25.9 kg/m2    Constitutional:  cooperative, alert and oriented, well developed, well nourished, in no acute distress thin      Skin:  warm and dry to the touch, no apparent skin lesions or masses noted          Head:  normocephalic, no masses or lesions        Eyes:  pupils equal and round, conjunctivae and lids unremarkable, sclera white, no xanthalasma, EOMS intact, no nystagmus        Lymph:      ENT:  no pallor or cyanosis, dentition good        Neck:  carotid pulses are full and equal bilaterally;no carotid bruit        Respiratory:  clear to auscultation;normal symmetry         Cardiac: regular rhythm;no murmurs, gallops  or rubs detected frequent premature beats              pulses full and equal                                        GI:           Extremities and Muscular Skeletal:  no edema;no spinal abnormalities noted;normal muscle strength and tone              Neurological:  no gross motor deficits        Psych:  Alert and Oriented x 3            CC  Vaughn Bello MD  [id number]  [place] [id number]

## 2018-03-05 NOTE — LETTER
3/5/2018    Jose Riley MD  [address] [address]    RE: Nicholas Carrasquillofabian       Dear Colleague,    I had the pleasure of seeing Nicholas Gongora in the [facility].    Primary Provider: Jose Riley   Primary Cardiology: Dr. Bello    REASON FOR VISIT/CHIEF COMPLAINT: Mitral Valve disease    HISTORY OF PRESENT ILLNESS:  Mr. Gongora is a very nice 63-year-old gentleman with past medical history significant for schizophrenia, mitral valve prolapse, ultimately leading to mitral valve repair with a St. Mars ring in 04/2001 by Dr. Tato Mckeon.  He presented with new onset atrial fibrillation.  In addition to his mitral valve repair with a 22 mm ring, he underwent bilateral maze procedure and a left atrial appendage removal and repair of a patent foramen ovale.       Mr. Gongora returns to clinic stating that he thinks he is doing quite well. [facility]. He [facility] there when he passed out due to dehydration from loose frequent stools. According to Nicholas, they have found no source of bleeding. He has gone through an Endoscopy and Colonoscopy. He has another follow-up Colonoscopy in April.    Fortunately for Nicholas, [facility]. He was homeless prior to that. He now lives in an apartment.     Today he He has no chest, arm, neck, jaw or shoulder discomfort, no dyspnea on exertion, orthopnea or PND, no palpitations, lightheadedness, dizziness, syncope or near-syncope.  He notes no problems or side effects with his current medical regimen.       He reports his systolic blood pressure has been in the 120s [facility]. Today his blood pressure here was 143/56, it was checked twice    Echocardiogram shows his valve to be functioning well. There is trace mitral regurgitation.  Mean gradient across the valve is  4 mmHg.  His ascending  aorta is mildly dilated.  His ejection fraction is 55%-60%.     EKG today demonstrated SB with rate of 55 bpm.    Lab work reviewed today: normal results.        ASSESSMENT AND PLAN:  Nicholas appears to be doing quite well from a cardiac standpoint without clinical evidence of ischemia, heart failure or significant arrhythmia.        1. History of Atrial Fib: To his knowledge, he has had no reoccurrence of atrial fibrillation. I will continue his medical regimen as is.    2. Blood pressure is elevated today, will increase the lisinopril to 10 mg once a day. Return visit in 1 month with BMP prior to visit.  3. History of mitral valve prolapse-s/p mitral valve repair-yearly echos.      He will follow-up with Dr. Bello in one year.               Results for orders placed or performed in visit on 03/05/18 (from the past 24 hour(s))   Basic metabolic panel   Result Value Ref Range    Sodium 142 136 - 145 mmol/L    Potassium 4.1 3.5 - 5.1 mmol/L    Chloride 105 98 - 107 mmol/L    Carbon Dioxide 31 (H) 23 - 29 mmol/L    Anion Gap 10.1 6 - 17 mmol/L    Glucose 68 (L) 70 - 105 mg/dL    Urea Nitrogen 9 7 - 30 mg/dL    Creatinine 1.17 0.70 - 1.30 mg/dL    GFR Estimate 63 >60 mL/min/1.7m2    GFR Estimate If Black 76 >60 mL/min/1.7m2    Calcium 9.6 8.5 - 10.5 mg/dL         Thank you for allowing me to participate in Nicholas's care.    .      Jackie Decker. APRN, CNP  [facility]    Orders Placed This Encounter   Procedures     Basic metabolic panel     Follow-Up with Cardiac Advanced Practice Provider     EKG 12-lead complete w/read - Clinics (performed today)       Orders Placed This Encounter   Medications     Aspirin (ASPIR-81 PO)     acetaminophen 500 MG CAPS     IRON, FERROUS GLUCONATE, PO     Sig: Take by mouth 3 times daily      lisinopril (PRINIVIL/ZESTRIL) 10 MG tablet     Sig: Take 1 tablet (10 mg) by mouth daily     Dispense:  90 tablet     Refill:  1     metoprolol succinate (TOPROL-XL)  25 MG 24 hr tablet     Sig: Take 1 tablet (25 mg) by mouth daily     Dispense:  90 tablet     Refill:  3       Medications Discontinued During This Encounter   Medication Reason     Aspirin-Caffeine (ANACIN PO) Stopped by Patient     lisinopril (PRINIVIL/ZESTRIL) 5 MG tablet Reorder     metoprolol (TOPROL-XL) 25 MG 24 hr tablet Reorder         Encounter Diagnoses   Name Primary?     Mitral valve disease      Hypertension, essential Yes     MVP (mitral valve prolapse)        CURRENT MEDICATIONS:  Current Outpatient Prescriptions   Medication Sig Dispense Refill     Aspirin (ASPIR-81 PO)        acetaminophen 500 MG CAPS        IRON, FERROUS GLUCONATE, PO Take by mouth 3 times daily        lisinopril (PRINIVIL/ZESTRIL) 10 MG tablet Take 1 tablet (10 mg) by mouth daily 90 tablet 1     metoprolol succinate (TOPROL-XL) 25 MG 24 hr tablet Take 1 tablet (25 mg) by mouth daily 90 tablet 3     ziprasidone (GEODON) 80 MG capsule Take 80 mg by mouth daily       [DISCONTINUED] lisinopril (PRINIVIL/ZESTRIL) 5 MG tablet Take 1 tablet (5 mg) by mouth daily 90 tablet 3     [DISCONTINUED] metoprolol (TOPROL-XL) 25 MG 24 hr tablet Take 1 tablet (25 mg) by mouth daily 90 tablet 3       ALLERGIES   No Known Allergies    PAST MEDICAL HISTORY:  Past Medical History:   Diagnosis Date     Atrial fibrillation (aka AFIB)     s/p MAZE right and left, removal left atrial appendage 04/29/2011     Family history of early CAD      GERD (gastroesophageal reflux disease)      Hypertension      MVP (mitral valve prolapse)     s/p mitral valve repair, 32mm St. Mars ring 04/29/2011     Need for SBE (subacute bacterial endocarditis) prophylaxis      PFO (patent foramen ovale)     s/p PFO closure, 04/29/2011     Schizophrenia (H)      Sleep apnea        PAST SURGICAL HISTORY:  Past Surgical History:   Procedure Laterality Date     OTHER SURGICAL HISTORY  2011    s/p mitral valve repair, 32mm St. Mars ring 04/29/2011     OTHER SURGICAL HISTORY  2011     s/p MAZE right and left, removal left atrial appendage 04/29/2011     OTHER SURGICAL HISTORY  2011    s/p PFO closure, 04/29/2011     OTHER SURGICAL HISTORY      esophageal surgery for GERD 1998     OTHER SURGICAL HISTORY      laproscopic Nissen fundoplication, 1998     TONSILLECTOMY         FAMILY HISTORY:  Family History   Problem Relation Age of Onset     Myocardial Infarction Mother      Hypertension Mother      Myocardial Infarction Father        SOCIAL HISTORY:  Social History     Social History     Marital status: Single     Spouse name: N/A     Number of children: N/A     Years of education: N/A     Social History Main Topics     Smoking status: Former Smoker     Quit date: 2/19/1995     Smokeless tobacco: Never Used     Alcohol use No     Drug use: No     Sexual activity: Not Asked     Other Topics Concern     Parent/Sibling W/ Cabg, Mi Or Angioplasty Before 65f 55m? No     Caffeine Concern Yes     soda     Special Diet No     Exercise Yes     daily, walking     Seat Belt Yes     Social History Narrative       Review of Systems:  Skin:  Negative       Eyes:  Positive for glasses    ENT:  Negative      Respiratory:  Negative       Cardiovascular:  Negative      Gastroenterology: Negative      Genitourinary:  Negative      Musculoskeletal:  Positive for back pain;neck pain    Neurologic:  Positive for numbness or tingling of feet    Psychiatric:  Positive for sleep disturbances    Heme/Lymph/Imm:  Negative      Endocrine:  Negative        Physical Exam:  Vitals: /85  Pulse 56  Ht 1.829 m (6')  Wt 86.6 kg (191 lb)  BMI 25.9 kg/m2    Constitutional:  cooperative, alert and oriented, well developed, well nourished, in no acute distress thin      Skin:  warm and dry to the touch, no apparent skin lesions or masses noted          Head:  normocephalic, no masses or lesions        Eyes:  pupils equal and round, conjunctivae and lids unremarkable, sclera white, no xanthalasma, EOMS intact, no nystagmus         Lymph:      ENT:  no pallor or cyanosis, dentition good        Neck:  carotid pulses are full and equal bilaterally;no carotid bruit        Respiratory:  clear to auscultation;normal symmetry         Cardiac: regular rhythm;no murmurs, gallops or rubs detected frequent premature beats              pulses full and equal                                        GI:           Extremities and Muscular Skeletal:  no edema;no spinal abnormalities noted;normal muscle strength and tone              Neurological:  no gross motor deficits        Psych:  Alert and Oriented x 3            CC  Vaughn Bello MD  [address]  [place] [id number]                Thank you for allowing me to participate in the care of your patient.      Sincerely,     NESTOR [facility]    cc:   Vaughn Bello MD  [address]  [place] [id number]

## 2018-03-05 NOTE — PATIENT INSTRUCTIONS
For better blood pressure control- Increase the lisinopril to 10 mg once a day.    See you back in 1month

## 2018-03-15 ENCOUNTER — CARE COORDINATION (OUTPATIENT)
Dept: CARDIOLOGY | Facility: CLINIC | Age: 63
End: 2018-03-15

## 2018-03-15 NOTE — PROGRESS NOTES
Received VM from patient stating he was returning a call from nursing staff.  No notes recorded stating patient was attempted to be reached. Pt also voices numerous concerns related to his cardiac care and voices dissatisfaction with care.      Call back placed to patient.  No answer, left message with Team 5 call back number.

## 2018-03-15 NOTE — LETTER
March 16, 2018     TO: Nicholas Gongora   [address]    [facility] [address]     Dear Mr. Gongora,  The results of your recent Laboratory tests.    Results for orders placed or performed in visit on 03/05/18   CBC with platelets differential   Result Value Ref Range    WBC 7.7 10^9/L    RBC Count 3.14 10^12/L    Hemoglobin 9 (A) 13.3 - 17.7 g/dL    Hematocrit 29 %    MCV 92.4 fl    MCH 28.7 pg    MCHC 31 g/dL    RDW 14.6 %    Platelet Count 450 150 - 450 10^9/L    % Neutrophils  %    % Lymphocytes 12.5 %    % Monocytes 7.4 %    % Eosinophils 0.1 %    % Basophils 0.4 %   Comprehensive metabolic panel   Result Value Ref Range    Sodium 144 mmol/L    Potassium 3.9 mmol/L    Chloride 111 mmol/L    Carbon Dioxide 25 mmol/L    Anion Gap 8 mmol/L    Glucose 99 70 - 99 mg/dL    Urea Nitrogen 13 mg/dL    Creatinine 1.0 mg/dL    Calcium 8.8 mg/dL    Protein Total  g/dL    Albumin  g/dL    Bilirubin Total 0.3 mg/dL    Alkaline Phosphatase 65 U/L    AST 25 U/L    ALT 27 U/L    GFR Estimate >60 ml/min/1.73m2    GFR Estimate If Black  ml/min/1.73m2   TSH   Result Value Ref Range    TSH 1.35 mcU/mL     Your test results fall within the expected range(s) or remain unchanged from previous results.  Please continue with current treatment plan. Please call [phone] with questions or concerns.     Sincerely,  [facility]

## 2018-03-16 NOTE — PROGRESS NOTES
Received voicemail message from pt. Attempted to call pt back, left message for pt to call back.     Discussed previously with Jackie Decker re: Nile. Prescription was sent to pt's pharmacy by mistake. Jackie states that she called pt back immediately and informed him that this was sent in error & he should disregard the prescription. LP[place]field RN

## 2018-03-19 ENCOUNTER — TELEPHONE (OUTPATIENT)
Dept: CARDIOLOGY | Facility: CLINIC | Age: 63
End: 2018-03-19

## 2018-03-19 NOTE — TELEPHONE ENCOUNTER
Pt called left voice message regarding follow up- he wants to continue to follow up with Kareen Ruth NP and Dr. Bello.  Pt saw Cedric NP 03/05/2018 when Kareen was ill.  He is asking for a call back whether he can follow up with Kareen or Dr. Bello in the future.  Called pt - no answer.  Left voice message to please call me back to review.

## 2018-04-17 ENCOUNTER — TRANSFERRED RECORDS (OUTPATIENT)
Dept: HEALTH INFORMATION MANAGEMENT | Facility: CLINIC | Age: 63
End: 2018-04-17

## 2018-08-23 DIAGNOSIS — I05.9 MITRAL VALVE DISEASE: ICD-10-CM

## 2018-08-23 DIAGNOSIS — I10 HYPERTENSION, ESSENTIAL: ICD-10-CM

## 2018-08-23 RX ORDER — LISINOPRIL 10 MG/1
10 TABLET ORAL DAILY
Qty: 90 TABLET | Refills: 1 | Status: SHIPPED | OUTPATIENT
Start: 2018-08-23 | End: 2019-02-18

## 2018-10-17 ENCOUNTER — TRANSFERRED RECORDS (OUTPATIENT)
Dept: HEALTH INFORMATION MANAGEMENT | Facility: CLINIC | Age: 63
End: 2018-10-17

## 2018-10-17 LAB
ANION GAP SERPL CALCULATED.3IONS-SCNC: 7 MMOL/L (ref 5–15)
BUN SERPL-MCNC: 11 MG/DL (ref 8–26)
CALCIUM SERPL-MCNC: 9 MG/DL (ref 8.5–10.1)
CHLORIDE SERPLBLD-SCNC: 110 MMOL/L (ref 98–107)
CHOLEST SERPL-MCNC: 168 MG/DL (ref ?–199)
CO2 SERPL-SCNC: 27 MMOL/L (ref 21–32)
CREAT SERPL-MCNC: 1.1 MG/DL (ref 0.7–1.2)
ERYTHROCYTE [DISTWIDTH] IN BLOOD BY AUTOMATED COUNT: NORMAL %
GFR SERPL CREATININE-BSD FRML MDRD: ABNORMAL ML/MIN/1.73M2
GLUCOSE SERPL-MCNC: 101 MG/DL (ref 74–106)
HCT VFR BLD AUTO: 43.4 % (ref 41–54)
HDLC SERPL-MCNC: 32 MG/DL
HEMOGLOBIN: 14.3 G/DL (ref 13.5–17.9)
LDLC SERPL CALC-MCNC: 78 MG/DL
MCH RBC QN AUTO: NORMAL PG
MCHC RBC AUTO-ENTMCNC: NORMAL G/DL
MCV RBC AUTO: NORMAL FL
NONHDLC SERPL-MCNC: 136 MG/DL
PLATELET # BLD AUTO: 304 10^9/L (ref 150–400)
POTASSIUM SERPL-SCNC: 3.8 MMOL/L (ref 3.5–5)
RBC # BLD AUTO: 4.69 10^12/L (ref 4.6–6.2)
SODIUM SERPL-SCNC: 144 MMOL/L (ref 136–145)
TRIGL SERPL-MCNC: 291 MG/DL (ref ?–149)
WBC # BLD AUTO: 8.4 10^9/L (ref 4–11)

## 2019-02-06 ENCOUNTER — PRE VISIT (OUTPATIENT)
Dept: CARDIOLOGY | Facility: CLINIC | Age: 64
End: 2019-02-06

## 2019-02-07 NOTE — TELEPHONE ENCOUNTER
[facility]17-18 for general F/U.   Records sent to scan.   Request re faxed looking for any recent labs. Lipids or BMP.

## 2019-02-13 ENCOUNTER — DOCUMENTATION ONLY (OUTPATIENT)
Dept: CARDIOLOGY | Facility: CLINIC | Age: 64
End: 2019-02-13

## 2019-02-18 DIAGNOSIS — I10 HYPERTENSION, ESSENTIAL: ICD-10-CM

## 2019-02-18 DIAGNOSIS — I05.9 MITRAL VALVE DISEASE: ICD-10-CM

## 2019-02-18 RX ORDER — LISINOPRIL 10 MG/1
10 TABLET ORAL DAILY
Qty: 90 TABLET | Refills: 0 | Status: SHIPPED | OUTPATIENT
Start: 2019-02-18 | End: 2019-03-05

## 2019-02-26 ENCOUNTER — DOCUMENTATION ONLY (OUTPATIENT)
Dept: CARDIOLOGY | Facility: CLINIC | Age: 64
End: 2019-02-26

## 2019-02-26 ENCOUNTER — TELEPHONE (OUTPATIENT)
Dept: CARDIOLOGY | Facility: CLINIC | Age: 64
End: 2019-02-26

## 2019-02-26 ENCOUNTER — HOSPITAL ENCOUNTER (OUTPATIENT)
Dept: CARDIOLOGY | Facility: CLINIC | Age: 64
Discharge: HOME | End: 2019-02-26
Attending: INTERNAL MEDICINE | Admitting: INTERNAL MEDICINE
Payer: MEDICARE

## 2019-02-26 DIAGNOSIS — I34.1 MITRAL VALVE PROLAPSE, NONRHEUMATIC: ICD-10-CM

## 2019-02-26 DIAGNOSIS — Z98.890 H/O MITRAL VALVE REPAIR: ICD-10-CM

## 2019-02-26 DIAGNOSIS — I34.1 MVP (MITRAL VALVE PROLAPSE): ICD-10-CM

## 2019-02-26 DIAGNOSIS — I34.1 MVP (MITRAL VALVE PROLAPSE): Primary | ICD-10-CM

## 2019-02-26 DIAGNOSIS — I05.9 MITRAL VALVE DISEASE: ICD-10-CM

## 2019-02-26 LAB
ANION GAP SERPL CALCULATED.3IONS-SCNC: 15.1 MMOL/L (ref 6–17)
BUN SERPL-MCNC: 11 MG/DL (ref 7–30)
CALCIUM SERPL-MCNC: 9.3 MG/DL (ref 8.5–10.5)
CHLORIDE SERPL-SCNC: 106 MMOL/L (ref 98–107)
CO2 SERPL-SCNC: 26 MMOL/L (ref 23–29)
CREAT SERPL-MCNC: 1.04 MG/DL (ref 0.7–1.3)
GFR SERPL CREATININE-BSD FRML MDRD: 72 ML/MIN/{1.73_M2}
GLUCOSE SERPL-MCNC: 98 MG/DL (ref 70–105)
POTASSIUM SERPL-SCNC: 4.1 MMOL/L (ref 3.5–5.1)
SODIUM SERPL-SCNC: 143 MMOL/L (ref 136–145)

## 2019-02-26 PROCEDURE — 93306 TTE W/DOPPLER COMPLETE: CPT | Mod: 26 | Performed by: INTERNAL MEDICINE

## 2019-02-26 PROCEDURE — 80048 BASIC METABOLIC PNL TOTAL CA: CPT | Performed by: INTERNAL MEDICINE

## 2019-02-26 PROCEDURE — 93306 TTE W/DOPPLER COMPLETE: CPT

## 2019-02-26 PROCEDURE — 36415 COLL VENOUS BLD VENIPUNCTURE: CPT | Performed by: INTERNAL MEDICINE

## 2019-02-26 NOTE — PROGRESS NOTES
Echo 2/26/19 noted, to be reviewed at OV 3/5/19.  Aortic root=4.3cm (2018=3.8cm)  Ascending aorta=4.0com (2018=3.6cm)  History of MVR, MAZE & PFO closure in 2011  Will message Dr. Bello to review prior to OV    Message from Dr. Bello:  We will do a CT or MRI of his aorta I will talk to him first

## 2019-03-05 ENCOUNTER — OFFICE VISIT (OUTPATIENT)
Dept: CARDIOLOGY | Facility: CLINIC | Age: 64
End: 2019-03-05
Payer: COMMERCIAL

## 2019-03-05 VITALS
SYSTOLIC BLOOD PRESSURE: 154 MMHG | HEIGHT: 72 IN | BODY MASS INDEX: 27.5 KG/M2 | WEIGHT: 203 LBS | HEART RATE: 59 BPM | DIASTOLIC BLOOD PRESSURE: 95 MMHG

## 2019-03-05 DIAGNOSIS — I48.0 PAROXYSMAL ATRIAL FIBRILLATION (H): Primary | ICD-10-CM

## 2019-03-05 DIAGNOSIS — I10 BENIGN ESSENTIAL HYPERTENSION: ICD-10-CM

## 2019-03-05 DIAGNOSIS — I34.1 MVP (MITRAL VALVE PROLAPSE): ICD-10-CM

## 2019-03-05 DIAGNOSIS — I05.9 MITRAL VALVE DISORDER: ICD-10-CM

## 2019-03-05 DIAGNOSIS — I77.810 ASCENDING AORTA DILATATION (H): ICD-10-CM

## 2019-03-05 PROCEDURE — 99213 OFFICE O/P EST LOW 20 MIN: CPT | Performed by: INTERNAL MEDICINE

## 2019-03-05 RX ORDER — LISINOPRIL 20 MG/1
20 TABLET ORAL DAILY
Qty: 30 TABLET | Refills: 11 | Status: SHIPPED | OUTPATIENT
Start: 2019-03-05 | End: 2020-02-24

## 2019-03-05 RX ORDER — METOPROLOL SUCCINATE 25 MG/1
25 TABLET, EXTENDED RELEASE ORAL DAILY
Qty: 30 TABLET | Refills: 11 | Status: SHIPPED | OUTPATIENT
Start: 2019-03-05 | End: 2020-02-24

## 2019-03-05 ASSESSMENT — MIFFLIN-ST. JEOR: SCORE: 1743.8

## 2019-03-05 NOTE — PROGRESS NOTES
Service Date: 03/05/2019      HISTORY OF PRESENT ILLNESS:  Mr. Gongora is a very nice 64-year-old gentleman with past medical history significant for schizophrenia, mitral valve prolapse, ultimately undergoing a mitral valve repair with a St. Mars ring in 04/2001 by Dr. Eugene Mckeon.  At that time he had new onset atrial fibrillation, so in addition to his mitral valve repair with a 22 mm ring he underwent a bilateral maze procedure and a left atrial appendage removal and repair of a patent foramen ovale.      Mr. Gongora returns to clinic.  Intermittently over the years he has been homeless and unfortunately just went through another period of being homeless.  He now has an [facility] [place] and he takes 3 buses to get here for his clinic visits.  He states he is doing well.  He has no chest, arm, neck, jaw or shoulder discomfort.  No dyspnea on exertion, orthopnea or PND.  No palpitations, lightheadedness, dizziness, syncope, near-syncope.  He notes no problems or side effects with his current medical regimen.      Review of the records show [facility] with [facility].  A GI workup was completed and no source of bleeding was found.  He was off aspirin for a while and he has been given iron supplements.  He had a similar problem in 2017.      Nicholas states he has been taking his medicines consistently and now has an insurance plan again.      ASSESSMENT AND PLAN:  Nicholas appears to be doing well from a cardiac standpoint without clinical evidence of ischemia, heart failure or significant arrhythmia.      Echocardiogram demonstrates a mean gradient across the mitral valve of 3, ejection fraction was 60%.  He has mild to moderate concentric left ventricular hypertrophy.  This echo, however, demonstrates his sinus of Valsalva is now dilated to 4.3 and his ascending aorta is 4.0.  This is a significant change from previous echocardiograms.  I suspect it may be angulation,  but I have recommended we do a CT scan to get a more accurate measurement to see his aorta.      Blood pressure is not well controlled today and as stated he has been on his medications.  I did recheck it myself and again blood pressure is quite high, recorded blood pressure by the MA is 154/95, mine is even higher.  I will increase his lisinopril from 10 mg to 20 mg and have him follow up with an BRENDAN with a BMP, a blood pressure check and to follow up on a CT scan.      I renewed his medications today.  I will have him return in 1 year at which time we will repeat the echocardiogram.  We will follow him as necessary to get good control of his blood pressure.  If he continues to be significantly high, we may want to look at his renal arteries as this is a fairly dramatic change.      Thank you for allowing me to participate in his care.         MAIRA ZHANG MD, [facility]             D: 2019   T: 2019   MT: MINA      Name:     TETO DYSON   MRN:      [id number]        Account:      [id number]   :      1955           Service Date: 2019      Document: [id number]

## 2019-03-05 NOTE — PROGRESS NOTES
HPI and Plan:   See dictation    Orders Placed This Encounter   Procedures     CT Chest Angio w/o & w Contrast     Basic metabolic panel     Basic metabolic panel     Follow-Up with Cardiac Advanced Practice Provider     Follow-Up with Cardiac Advanced Practice Provider     Follow-Up with Cardiologist     Echocardiogram Complete       Orders Placed This Encounter   Medications     metoprolol succinate ER (TOPROL-XL) 25 MG 24 hr tablet     Sig: Take 1 tablet (25 mg) by mouth daily     Dispense:  30 tablet     Refill:  11     lisinopril (PRINIVIL/ZESTRIL) 20 MG tablet     Sig: Take 1 tablet (20 mg) by mouth daily     Dispense:  30 tablet     Refill:  11       Medications Discontinued During This Encounter   Medication Reason     metoprolol succinate (TOPROL-XL) 25 MG 24 hr tablet Reorder     lisinopril (PRINIVIL/ZESTRIL) 10 MG tablet          Encounter Diagnoses   Name Primary?     Paroxysmal atrial fibrillation (H) Yes     Ascending aorta dilatation (H)      Benign essential hypertension      MVP (mitral valve prolapse)      Mitral valve disorder        CURRENT MEDICATIONS:  Current Outpatient Medications   Medication Sig Dispense Refill     acetaminophen 500 MG CAPS        Aspirin (ASPIR-81 PO)        IRON, FERROUS GLUCONATE, PO Take by mouth 3 times daily        lisinopril (PRINIVIL/ZESTRIL) 20 MG tablet Take 1 tablet (20 mg) by mouth daily 30 tablet 11     metoprolol succinate ER (TOPROL-XL) 25 MG 24 hr tablet Take 1 tablet (25 mg) by mouth daily 30 tablet 11     ziprasidone (GEODON) 80 MG capsule Take 80 mg by mouth daily         ALLERGIES   No Known Allergies    PAST MEDICAL HISTORY:  Past Medical History:   Diagnosis Date     Atrial fibrillation (aka AFIB)     s/p MAZE right and left, removal left atrial appendage 04/29/2011     Family history of early CAD      GERD (gastroesophageal reflux disease)      Hypertension      MVP (mitral valve prolapse)     s/p mitral valve repair, 32mm St. Mars ring 04/29/2011      Need for SBE (subacute bacterial endocarditis) prophylaxis      PFO (patent foramen ovale)     s/p PFO closure, 2011     Schizophrenia (H)      Sleep apnea        PAST SURGICAL HISTORY:  Past Surgical History:   Procedure Laterality Date     OTHER SURGICAL HISTORY      s/p mitral valve repair, 32mm St. Mars ring 2011     OTHER SURGICAL HISTORY      s/p MAZE right and left, removal left atrial appendage 2011     OTHER SURGICAL HISTORY      s/p PFO closure, 2011     OTHER SURGICAL HISTORY      esophageal surgery for GERD      OTHER SURGICAL HISTORY      laproscopic Nissen fundoplication,      TONSILLECTOMY         FAMILY HISTORY:  Family History   Problem Relation Age of Onset     Myocardial Infarction Mother      Hypertension Mother      Myocardial Infarction Father        SOCIAL HISTORY:  Social History     Socioeconomic History     Marital status: Single     Spouse name: None     Number of children: None     Years of education: None     Highest education level: None   Occupational History     None   Social Needs     Financial resource strain: None     Food insecurity:     Worry: None     Inability: None     Transportation needs:     Medical: None     Non-medical: None   Tobacco Use     Smoking status: Former Smoker     Last attempt to quit: 1995     Years since quittin.0     Smokeless tobacco: Never Used   Substance and Sexual Activity     Alcohol use: No     Drug use: No     Sexual activity: None   Lifestyle     Physical activity:     Days per week: None     Minutes per session: None     Stress: None   Relationships     Social connections:     Talks on phone: None     Gets together: None     Attends Christian service: None     Active member of club or organization: None     Attends meetings of clubs or organizations: None     Relationship status: None     Intimate partner violence:     Fear of current or ex partner: None     Emotionally abused: None      Physically abused: None     Forced sexual activity: None   Other Topics Concern     Parent/sibling w/ CABG, MI or angioplasty before 65F 55M? No      Service Not Asked     Blood Transfusions Not Asked     Caffeine Concern Yes     Comment: soda     Occupational Exposure Not Asked     Hobby Hazards Not Asked     Sleep Concern Not Asked     Stress Concern Not Asked     Weight Concern Not Asked     Special Diet No     Back Care Not Asked     Exercise Yes     Comment: daily, walking     Bike Helmet Not Asked     Seat Belt Yes     Self-Exams Not Asked   Social History Narrative     None       Review of Systems:  Skin:  Negative       Eyes:  Positive for glasses;cataracts    ENT:  Negative      Respiratory:  Negative       Cardiovascular:    fatigue;Positive for    Gastroenterology: Positive for nausea on occasion  Genitourinary:  Negative      Musculoskeletal:  Positive for back pain;neck pain    Neurologic:  Positive for numbness or tingling of feet;headaches headaches due to neck pain  Psychiatric:  Positive for sleep disturbances    Heme/Lymph/Imm:  Negative      Endocrine:  Negative        Physical Exam:  Vitals: BP (!) 154/95   Pulse 59   Ht 1.829 m (6')   Wt 92.1 kg (203 lb)   BMI 27.53 kg/m      Constitutional:  cooperative, alert and oriented, well developed, well nourished, in no acute distress        Skin:  warm and dry to the touch, no apparent skin lesions or masses noted surgical scars well-healed        Head:  normocephalic, no masses or lesions        Eyes:  pupils equal and round, conjunctivae and lids unremarkable, sclera white, no xanthalasma, EOMS intact, no nystagmus        Lymph:      ENT:  no pallor or cyanosis poor dentition      Neck:  carotid pulses are full and equal bilaterally;no carotid bruit        Respiratory:  normal breath sounds, clear to auscultation, normal A-P diameter, normal symmetry, normal respiratory excursion, no use of accessory muscles         Cardiac: regular  rhythm;no murmurs, gallops or rubs detected occasional premature beats              pulses full and equal                                        GI:           Extremities and Muscular Skeletal:  no edema;no spinal abnormalities noted;normal muscle strength and tone              Neurological:  no gross motor deficits        Psych:  affect appropriate, oriented to time, person and place        CC  Jose Riley MD  [address] [address]

## 2019-03-05 NOTE — LETTER
3/5/2019      Jose Riley MD  [address] [address]      RE: Nicholas BALLESTEROS Rolan       Dear Colleague,    I had the pleasure of seeing Nicholas Gongora in the [facility].    Service Date: 03/05/2019      HISTORY OF PRESENT ILLNESS:  Mr. Gongora is a very nice 64-year-old gentleman with past medical history significant for schizophrenia, mitral valve prolapse, ultimately undergoing a mitral valve repair with a [place] Mars ring in 04/2001 by Dr. Eugene Mckeon.  At that time he had new onset atrial fibrillation, so in addition to his mitral valve repair with a 22 mm ring he underwent a bilateral maze procedure and a left atrial appendage removal and repair of a patent foramen ovale.      Mr. Gongora returns to clinic.  Intermittently over the years he has been homeless and unfortunately just went through another period of being homeless.  He now has an [facility] [place] and he takes 3 buses to get here for his clinic visits.  He states he is doing well.  He has no chest, arm, neck, jaw or shoulder discomfort.  No dyspnea on exertion, orthopnea or PND.  No palpitations, lightheadedness, dizziness, syncope, near-syncope.  He notes no problems or side effects with his current medical regimen.      Review of the [facility].  A GI workup was completed and no source of bleeding was found.  He was off aspirin for a while and he has been given iron supplements.  He had a similar problem in 2017.      Nicholas states he has been taking his medicines consistently and now has an insurance plan again.      ASSESSMENT AND PLAN:  Nicholas appears to be doing well from a cardiac standpoint without clinical evidence of ischemia, heart failure or significant arrhythmia.      Echocardiogram demonstrates a mean gradient across the mitral valve of 3, ejection fraction was 60%.  He has mild  to moderate concentric left ventricular hypertrophy.  This echo, however, demonstrates his sinus of Valsalva is now dilated to 4.3 and his ascending aorta is 4.0.  This is a significant change from previous echocardiograms.  I suspect it may be angulation, but I have recommended we do a CT scan to get a more accurate measurement to see his aorta.      Blood pressure is not well controlled today and as stated he has been on his medications.  I did recheck it myself and again blood pressure is quite high, recorded blood pressure by the MA is 154/95, mine is even higher.  I will increase his lisinopril from 10 mg to 20 mg and have him follow up with an BRENDAN with a BMP, a blood pressure check and to follow up on a CT scan.      I renewed his medications today.  I will have him return in 1 year at which time we will repeat the echocardiogram.  We will follow him as necessary to get good control of his blood pressure.  If he continues to be significantly high, we may want to look at his renal arteries as this is a fairly dramatic change.      Thank you for allowing me to participate in his care.         MAIRA ZHANG MD, [facility]             D: 2019   T: 2019   MT: MINA      Name:     TETO DYSON   MRN:      [id number]        Account:      [id number]   :      1955           Service Date: 2019      Document: [id number]         Outpatient Encounter Medications as of 3/5/2019   Medication Sig Dispense Refill     acetaminophen 500 MG CAPS        Aspirin (ASPIR-81 PO)        IRON, FERROUS GLUCONATE, PO Take by mouth 3 times daily        lisinopril (PRINIVIL/ZESTRIL) 20 MG tablet Take 1 tablet (20 mg) by mouth daily 30 tablet 11     metoprolol succinate ER (TOPROL-XL) 25 MG 24 hr tablet Take 1 tablet (25 mg) by mouth daily 30 tablet 11     ziprasidone (GEODON) 80 MG capsule Take 80 mg by mouth daily       [DISCONTINUED] lisinopril (PRINIVIL/ZESTRIL) 10 MG tablet Take 1 tablet (10 mg) by mouth  daily 90 tablet 0     [DISCONTINUED] metoprolol succinate (TOPROL-XL) 25 MG 24 hr tablet Take 1 tablet (25 mg) by mouth daily 90 tablet 3     No facility-administered encounter medications on file as of 3/5/2019.        Again, thank you for allowing me to participate in the care of your patient.      Sincerely,    Vauhgn Bello MD     [facility]

## 2019-03-05 NOTE — LETTER
3/5/2019    Jose Riley MD  [address] [address]    RE: Nicholas Gongora       Dear Colleague,    I had the pleasure of seeing Nicholas Gongora in the [facility].    HPI and Plan:   See dictation    Orders Placed This Encounter   Procedures     CT Chest Angio w/o & w Contrast     Basic metabolic panel     Basic metabolic panel     Follow-Up with Cardiac Advanced Practice Provider     Follow-Up with Cardiac Advanced Practice Provider     Follow-Up with Cardiologist     Echocardiogram Complete       Orders Placed This Encounter   Medications     metoprolol succinate ER (TOPROL-XL) 25 MG 24 hr tablet     Sig: Take 1 tablet (25 mg) by mouth daily     Dispense:  30 tablet     Refill:  11     lisinopril (PRINIVIL/ZESTRIL) 20 MG tablet     Sig: Take 1 tablet (20 mg) by mouth daily     Dispense:  30 tablet     Refill:  11       Medications Discontinued During This Encounter   Medication Reason     metoprolol succinate (TOPROL-XL) 25 MG 24 hr tablet Reorder     lisinopril (PRINIVIL/ZESTRIL) 10 MG tablet          Encounter Diagnoses   Name Primary?     Paroxysmal atrial fibrillation (H) Yes     Ascending aorta dilatation (H)      Benign essential hypertension      MVP (mitral valve prolapse)      Mitral valve disorder        CURRENT MEDICATIONS:  Current Outpatient Medications   Medication Sig Dispense Refill     acetaminophen 500 MG CAPS        Aspirin (ASPIR-81 PO)        IRON, FERROUS GLUCONATE, PO Take by mouth 3 times daily        lisinopril (PRINIVIL/ZESTRIL) 20 MG tablet Take 1 tablet (20 mg) by mouth daily 30 tablet 11     metoprolol succinate ER (TOPROL-XL) 25 MG 24 hr tablet Take 1 tablet (25 mg) by mouth daily 30 tablet 11     ziprasidone (GEODON) 80 MG capsule Take 80 mg by mouth daily         ALLERGIES   No Known Allergies    PAST MEDICAL HISTORY:  Past Medical History:   Diagnosis Date     Atrial fibrillation (aka AFIB)      s/p MAZE right and left, removal left atrial appendage 2011     Family history of early CAD      GERD (gastroesophageal reflux disease)      Hypertension      MVP (mitral valve prolapse)     s/p mitral valve repair, 32mm St. Mars ring 2011     Need for SBE (subacute bacterial endocarditis) prophylaxis      PFO (patent foramen ovale)     s/p PFO closure, 2011     Schizophrenia (H)      Sleep apnea        PAST SURGICAL HISTORY:  Past Surgical History:   Procedure Laterality Date     OTHER SURGICAL HISTORY      s/p mitral valve repair, 32mm St[place] Mars ring 2011     OTHER SURGICAL HISTORY      s/p MAZE right and left, removal left atrial appendage 2011     OTHER SURGICAL HISTORY      s/p PFO closure, 2011     OTHER SURGICAL HISTORY      esophageal surgery for GERD      OTHER SURGICAL HISTORY      laproscopic Nissen fundoplication,      TONSILLECTOMY         FAMILY HISTORY:  Family History   Problem Relation Age of Onset     Myocardial Infarction Mother      Hypertension Mother      Myocardial Infarction Father        SOCIAL HISTORY:  Social History     Socioeconomic History     Marital status: Single     Spouse name: None     Number of children: None     Years of education: None     Highest education level: None   Occupational History     None   Social Needs     Financial resource strain: None     Food insecurity:     Worry: None     Inability: None     Transportation needs:     Medical: None     Non-medical: None   Tobacco Use     Smoking status: Former Smoker     Last attempt to quit: 1995     Years since quittin.0     Smokeless tobacco: Never Used   Substance and Sexual Activity     Alcohol use: No     Drug use: No     Sexual activity: None   Lifestyle     Physical activity:     Days per week: None     Minutes per session: None     Stress: None   Relationships     Social connections:     Talks on phone: None     Gets together: None     Attends Baptism  service: None     Active member of club or organization: None     Attends meetings of clubs or organizations: None     Relationship status: None     Intimate partner violence:     Fear of current or ex partner: None     Emotionally abused: None     Physically abused: None     Forced sexual activity: None   Other Topics Concern     Parent/sibling w/ CABG, MI or angioplasty before 65F 55M? No      Service Not Asked     Blood Transfusions Not Asked     Caffeine Concern Yes     Comment: soda     Occupational Exposure Not Asked     Hobby Hazards Not Asked     Sleep Concern Not Asked     Stress Concern Not Asked     Weight Concern Not Asked     Special Diet No     Back Care Not Asked     Exercise Yes     Comment: daily, walking     Bike Helmet Not Asked     Seat Belt Yes     Self-Exams Not Asked   Social History Narrative     None       Review of Systems:  Skin:  Negative       Eyes:  Positive for glasses;cataracts    ENT:  Negative      Respiratory:  Negative       Cardiovascular:    fatigue;Positive for    Gastroenterology: Positive for nausea on occasion  Genitourinary:  Negative      Musculoskeletal:  Positive for back pain;neck pain    Neurologic:  Positive for numbness or tingling of feet;headaches headaches due to neck pain  Psychiatric:  Positive for sleep disturbances    Heme/Lymph/Imm:  Negative      Endocrine:  Negative        Physical Exam:  Vitals: BP (!) 154/95   Pulse 59   Ht 1.829 m (6')   Wt 92.1 kg (203 lb)   BMI 27.53 kg/m       Constitutional:  cooperative, alert and oriented, well developed, well nourished, in no acute distress        Skin:  warm and dry to the touch, no apparent skin lesions or masses noted surgical scars well-healed        Head:  normocephalic, no masses or lesions        Eyes:  pupils equal and round, conjunctivae and lids unremarkable, sclera white, no xanthalasma, EOMS intact, no nystagmus        Lymph:      ENT:  no pallor or cyanosis poor dentition      Neck:   carotid pulses are full and equal bilaterally;no carotid bruit        Respiratory:  normal breath sounds, clear to auscultation, normal A-P diameter, normal symmetry, normal respiratory excursion, no use of accessory muscles         Cardiac: regular rhythm;no murmurs, gallops or rubs detected occasional premature beats              pulses full and equal                                        GI:           Extremities and Muscular Skeletal:  no edema;no spinal abnormalities noted;normal muscle strength and tone              Neurological:  no gross motor deficits        Psych:  affect appropriate, oriented to time, person and place        [place] [address]                Thank you for allowing me to participate in the care of your patient.      Sincerely,     Vaughn Bello MD     [facility]    cc:   [place] [address]

## 2019-03-15 ENCOUNTER — HOSPITAL ENCOUNTER (OUTPATIENT)
Dept: CARDIOLOGY | Facility: CLINIC | Age: 64
Discharge: HOME | End: 2019-03-15
Attending: INTERNAL MEDICINE | Admitting: INTERNAL MEDICINE
Payer: COMMERCIAL

## 2019-03-15 ENCOUNTER — TELEPHONE (OUTPATIENT)
Dept: CARDIOLOGY | Facility: CLINIC | Age: 64
End: 2019-03-15

## 2019-03-15 VITALS — DIASTOLIC BLOOD PRESSURE: 98 MMHG | SYSTOLIC BLOOD PRESSURE: 145 MMHG

## 2019-03-15 DIAGNOSIS — I77.810 ASCENDING AORTA DILATATION (H): ICD-10-CM

## 2019-03-15 DIAGNOSIS — I34.1 MVP (MITRAL VALVE PROLAPSE): ICD-10-CM

## 2019-03-15 LAB
ANION GAP SERPL CALCULATED.3IONS-SCNC: 11.9 MMOL/L (ref 6–17)
BUN SERPL-MCNC: 13 MG/DL (ref 7–30)
CALCIUM SERPL-MCNC: 9 MG/DL (ref 8.5–10.5)
CHLORIDE SERPL-SCNC: 107 MMOL/L (ref 98–107)
CO2 SERPL-SCNC: 27 MMOL/L (ref 23–29)
CREAT SERPL-MCNC: 1.17 MG/DL (ref 0.7–1.3)
GFR SERPL CREATININE-BSD FRML MDRD: 63 ML/MIN/{1.73_M2}
GLUCOSE SERPL-MCNC: 102 MG/DL (ref 70–105)
POTASSIUM SERPL-SCNC: 3.9 MMOL/L (ref 3.5–5.1)
SODIUM SERPL-SCNC: 142 MMOL/L (ref 136–145)

## 2019-03-15 PROCEDURE — 25000128 H RX IP 250 OP 636: Performed by: INTERNAL MEDICINE

## 2019-03-15 PROCEDURE — 80048 BASIC METABOLIC PNL TOTAL CA: CPT | Performed by: INTERNAL MEDICINE

## 2019-03-15 PROCEDURE — 71275 CT ANGIOGRAPHY CHEST: CPT

## 2019-03-15 PROCEDURE — 36415 COLL VENOUS BLD VENIPUNCTURE: CPT | Performed by: INTERNAL MEDICINE

## 2019-03-15 RX ORDER — IOPAMIDOL 755 MG/ML
500 INJECTION, SOLUTION INTRAVASCULAR ONCE
Status: COMPLETED | OUTPATIENT
Start: 2019-03-15 | End: 2019-03-15

## 2019-03-15 RX ADMIN — IOPAMIDOL 100 ML: 755 INJECTION, SOLUTION INTRAVENOUS at 10:00

## 2019-03-15 RX ADMIN — SODIUM CHLORIDE 100 ML: 9 INJECTION, SOLUTION INTRAVENOUS at 10:00

## 2019-03-15 NOTE — TELEPHONE ENCOUNTER
Chest CTa completed today, results below. Will route to Dr Bello as an FYI.     FINDINGS: The visible thyroid gland is unremarkable. Pretracheal lymph  node is noted in the mediastinum measuring up to 1.8 x 1.3 cm,  prominent in size. Subcarinal calcification, granulomatous. Heart size  is enlarged. No pericardial or pleural effusion. The visible solid  organs are unremarkable. Subpleural bullae in the right upper lobe  laterally, more so than left upper lobe. Minimal scattered bibasilar  atelectasis. No pulmonary masses. Focal lucency in a lower thoracic  vertebral body, measuring up to 1.2 cm. Uncertain if this is T12 given  limited field-of-view of the overall spine. Suspect this might be T11  or T12. Uncertain of exact etiology. It is possible this represents a  small hemangioma, though other etiologies cannot be excluded.    The ascending thoracic aorta is 3.7 cm AP x 3.5 cm transverse. The  aorta at the level of the arch is 3.1 cm, and the descending thoracic  aorta is 3.1 x 3 cm. No dissection, ulceration, pseudoaneurysm or  acute aortic pathology. Origins of the great arteries are widely  patent. The aortic root measures up to 4.2 cm, and the sinotubular  junction measures up to 3.5 cm.

## 2019-03-18 NOTE — TELEPHONE ENCOUNTER
Called to speak with RN team for Dr. Riley, left a message with staff describing the incidental finding of the focal lucency on the CT from 3/15/19. They will update the RN team and Dr. Riley.    Called patient to review CT scan results showing similar measurements of the aorta. Reviewed with patient that incidental finding of the spine showed a possible hemangioma. Reviewed with patient that Dr. Riley's office was update today with that finding and if he does not hear from them by mid-week to call to ask about the next steps to manage this finding. Patient states he tries to see a chiropractor for his back and neck pain but hasn't been for awhile due to transportation issues and cost; he treats his pain with tylenol. He denies any numbness or tingling of the extremities.

## 2019-03-25 ENCOUNTER — OFFICE VISIT (OUTPATIENT)
Dept: CARDIOLOGY | Facility: CLINIC | Age: 64
End: 2019-03-25
Attending: INTERNAL MEDICINE
Payer: COMMERCIAL

## 2019-03-25 VITALS
HEART RATE: 54 BPM | HEIGHT: 72 IN | SYSTOLIC BLOOD PRESSURE: 136 MMHG | WEIGHT: 203 LBS | DIASTOLIC BLOOD PRESSURE: 82 MMHG | BODY MASS INDEX: 27.5 KG/M2

## 2019-03-25 DIAGNOSIS — I34.1 MVP (MITRAL VALVE PROLAPSE): ICD-10-CM

## 2019-03-25 DIAGNOSIS — I05.9 MITRAL VALVE DISORDER: ICD-10-CM

## 2019-03-25 DIAGNOSIS — I77.810 ASCENDING AORTA DILATATION (H): Primary | ICD-10-CM

## 2019-03-25 DIAGNOSIS — I10 BENIGN ESSENTIAL HYPERTENSION: ICD-10-CM

## 2019-03-25 DIAGNOSIS — I48.0 PAROXYSMAL ATRIAL FIBRILLATION (H): ICD-10-CM

## 2019-03-25 DIAGNOSIS — Q21.12 PFO (PATENT FORAMEN OVALE): ICD-10-CM

## 2019-03-25 PROCEDURE — 99214 OFFICE O/P EST MOD 30 MIN: CPT | Performed by: NURSE PRACTITIONER

## 2019-03-25 ASSESSMENT — MIFFLIN-ST. JEOR: SCORE: 1743.8

## 2019-03-25 NOTE — LETTER
3/25/2019    Jose Riley MD  [address] [address]    RE: Nicholas Gongora       Dear Colleague,    I had the pleasure of seeing Nicholas Gongora in the [facility].    Cardiology Clinic Progress Note  Nicholas Gongora MRN# [id number]   YOB: 1955 Age: 64 year old          Assessment and Plan:     1. Mitral valve prolapse    S/p MVR in 2011    Most recent gradient 3 mmHg    Repeat echocardiogram in 1 year    2. Ascending aortic aneurysm    Echocardiogram measured at ascending aorta at 4.0 cm and aortic root at 4.3 cm    CT angiogram chest noted that the ascending aorta measured 3.7 cm x 3.5 cm in the aortic root is 4.2 cm.    Hypertension management and repeat echocardiogram in 1 year    3. Hypertension    Lisinopril increased to 20 mg at last visit with improved BP control    Continue Metoprolol XL 25 mg daily    Low sodium diet    BMP stable    4. Atrial fibrillation    S/p MAZE in 2011 and left atrial appendage ligation    Not on anticoagulation         History of Presenting Illness:    Nicholas Gongora is a very pleasant 64 year old patient of Dr. Bello who presents today for a follow up to review a basic metabolic panel, blood pressure and CTA.  He has a past medical history notable for schizophrenia, mitral valve prolapse status post mitral valve repair with a [place]. Mars ring in 2011 by Dr. Jonathan Mckeon.  Preoperatively he had a new onset of atrial fibrillation and therefore underwent a bilateral Maze procedure and left atrial appendage removal as well as a PFO repair.    Unfortunately, over the years he has been intermittently homeless.  He now resides in an [facility] and [place] and he takes up to 3 buses to get to the clinic visits. Due to insurance coverage he has been taking his medications intermittently.    His most recent echocardiogram showed a mean gradient across the  mitral valve of 3 mmHg with preserved LVEF at 60%.  There is mild to moderate concentric LVH and the sinus of Valsalva is now dilated at 4.3 and ascending aorta is 4.0 (previously the ascending aorta was measured at 3.6 cm and aortic root 3.8 cm in March 2018).  Due to a significant change from his previous echocardiograms it was recommended that he undergo a CTA chest ascending aorta measured 3.7 cm x 3.5 cm in the aortic root is 4.2 cm.  There was incidental finding of a focal lucency of his lower thoracic vertebrae that could represent a small hemangioma, but other possibilities cannot be ruled out.  It was recommended that he follow-up with his primary care provider for further evaluation and the patient is in the process of this. His blood pressure was also elevated at his last visit and Dr. Bello recommended that he increase his lisinopril from 10 mg to 20 mg daily and return to clinic today with a basic metabolic panel.     Today in clinic the results of the CTA chest were discussed in detail with the patient.  His blood pressure was initially elevated at the visit but upon my recheck it decreased to 136/82.  He is tolerating the increased dose of lisinopril and his BMP was stable.  He declines any shortness of breath on exertion, chest discomfort, palpitations or lower extremity edema.               Review of Systems:   Review of Systems:  Skin:  Negative     Eyes:  Positive for glasses;cataracts  ENT:  Negative    Respiratory:  Negative    Cardiovascular:    Positive for;fatigue  Gastroenterology: Negative    Genitourinary:  not assessed    Musculoskeletal:  Positive for back pain;neck pain  Neurologic:  Positive for numbness or tingling of feet;headaches  Psychiatric:  Positive for sleep disturbances  Heme/Lymph/Imm:  Negative    Endocrine:  Negative              Physical Exam:     Vitals: /82   Pulse 54   Ht 1.829 m (6')   Wt 92.1 kg (203 lb)   BMI 27.53 kg/m     Constitutional:  cooperative,  alert and oriented, well developed, well nourished, in no acute distress thin      Skin:  warm and dry to the touch, no apparent skin lesions or masses noted surgical scars well-healed      Head:  normocephalic, no masses or lesions        Eyes:  pupils equal and round, conjunctivae and lids unremarkable, sclera white, no xanthalasma, EOMS intact, no nystagmus        ENT:  no pallor or cyanosis poor dentition      Neck:  carotid pulses are full and equal bilaterally;no carotid bruit        Chest:  normal breath sounds, clear to auscultation, normal A-P diameter, normal symmetry, normal respiratory excursion, no use of accessory muscles        Cardiac: regular rhythm;no murmurs, gallops or rubs detected occasional premature beats                Vascular: pulses full and equal                                      Extremities and Back:  no edema;normal muscle strength and tone   wears compresson stockings    Neurological:  no gross motor deficits             Medications:     Current Outpatient Medications   Medication Sig Dispense Refill     acetaminophen 500 MG CAPS Take 1 capsule by mouth 3 times daily        Aspirin (ASPIR-81 PO) Take 1 tablet by mouth daily        IRON, FERROUS GLUCONATE, PO Take by mouth 3 times daily        lisinopril (PRINIVIL/ZESTRIL) 20 MG tablet Take 1 tablet (20 mg) by mouth daily 30 tablet 11     metoprolol succinate ER (TOPROL-XL) 25 MG 24 hr tablet Take 1 tablet (25 mg) by mouth daily 30 tablet 11     ziprasidone (GEODON) 80 MG capsule Take 80 mg by mouth daily         Family History   Problem Relation Age of Onset     Myocardial Infarction Mother      Hypertension Mother      Myocardial Infarction Father        Social History     Socioeconomic History     Marital status: Single     Spouse name: Not on file     Number of children: Not on file     Years of education: Not on file     Highest education level: Not on file   Occupational History     Not on file   Social Needs     Financial  resource strain: Not on file     Food insecurity:     Worry: Not on file     Inability: Not on file     Transportation needs:     Medical: Not on file     Non-medical: Not on file   Tobacco Use     Smoking status: Former Smoker     Last attempt to quit: 1995     Years since quittin.1     Smokeless tobacco: Never Used   Substance and Sexual Activity     Alcohol use: No     Drug use: No     Sexual activity: Not on file   Lifestyle     Physical activity:     Days per week: Not on file     Minutes per session: Not on file     Stress: Not on file   Relationships     Social connections:     Talks on phone: Not on file     Gets together: Not on file     Attends Temple service: Not on file     Active member of club or organization: Not on file     Attends meetings of clubs or organizations: Not on file     Relationship status: Not on file     Intimate partner violence:     Fear of current or ex partner: Not on file     Emotionally abused: Not on file     Physically abused: Not on file     Forced sexual activity: Not on file   Other Topics Concern     Parent/sibling w/ CABG, MI or angioplasty before 65F 55M? No      Service Not Asked     Blood Transfusions Not Asked     Caffeine Concern Yes     Comment: soda     Occupational Exposure Not Asked     Hobby Hazards Not Asked     Sleep Concern Not Asked     Stress Concern Not Asked     Weight Concern Not Asked     Special Diet No     Back Care Not Asked     Exercise Yes     Comment: daily, walking     Bike Helmet Not Asked     Seat Belt Yes     Self-Exams Not Asked   Social History Narrative     Not on file            Past Medical History:     Past Medical History:   Diagnosis Date     Atrial fibrillation (aka AFIB)     s/p MAZE right and left, removal left atrial appendage 2011     Family history of early CAD      GERD (gastroesophageal reflux disease)      Hypertension      MVP (mitral valve prolapse)     s/p mitral valve repair, 32mm St. Mars ring  04/29/2011     Need for SBE (subacute bacterial endocarditis) prophylaxis      PFO (patent foramen ovale)     s/p PFO closure, 04/29/2011     Schizophrenia (H)      Sleep apnea               Past Surgical History:     Past Surgical History:   Procedure Laterality Date     OTHER SURGICAL HISTORY  2011    s/p mitral valve repair, 32mm [place] ring 04/29/2011     OTHER SURGICAL HISTORY  2011    s/p MAZE right and left, removal left atrial appendage 04/29/2011     OTHER SURGICAL HISTORY  2011    s/p PFO closure, 04/29/2011     OTHER SURGICAL HISTORY      esophageal surgery for GERD 1998     OTHER SURGICAL HISTORY      laproscopic Nissen fundoplication, 1998     TONSILLECTOMY                Allergies:   Patient has no known allergies.       Data:   All laboratory data reviewed:    Recent Labs   Lab Test 10/17/18 11/09/17   LDL 78  --    HDL 32  --    NHDL 136  --    CHOL 168  --    TRIG 291*  --    TSH  --  1.35       Lab Results   Component Value Date    WBC 8.4 10/17/2018    RBC 4.69 10/17/2018    HGB 14.3 10/17/2018    HCT 43.4 10/17/2018    MCV 92.4 11/20/2017    MCH 28.7 11/20/2017    MCHC 31 11/20/2017    RDW 14.6 11/20/2017     10/17/2018       Lab Results   Component Value Date     03/15/2019    POTASSIUM 3.9 03/15/2019    CHLORIDE 107 03/15/2019    CO2 27 03/15/2019    ANIONGAP 11.9 03/15/2019     03/15/2019    BUN 13 03/15/2019    CR 1.17 03/15/2019    GFRESTIMATED 63 03/15/2019    GFRESTBLACK 76 03/15/2019    COLE 9.0 03/15/2019      Lab Results   Component Value Date    AST 25 11/09/2017    ALT 27 11/09/2017       Lab Results   Component Value Date    A1C 5.5 04/30/2011       Lab Results   Component Value Date    INR 1.42 (H) 05/04/2011    INR 1.13 05/03/2011         NESTOR HERNANDEZ, CNP  [facility]    Thank you for allowing me to participate in the care of your patient.      Sincerely,     NESTOR HERNANDEZ CNP     [facility]    cc:   Vaughn REAL  MD Ace  [address]  [place] [id number]

## 2019-03-25 NOTE — PROGRESS NOTES
Cardiology Clinic Progress Note  Nicholas Gongora MRN# [id number]   YOB: 1955 Age: 64 year old          Assessment and Plan:     1. Mitral valve prolapse    S/p MVR in 2011    Most recent gradient 3 mmHg    Repeat echocardiogram in 1 year    2. Ascending aortic aneurysm    Echocardiogram measured at ascending aorta at 4.0 cm and aortic root at 4.3 cm    CT angiogram chest noted that the ascending aorta measured 3.7 cm x 3.5 cm in the aortic root is 4.2 cm.    Hypertension management and repeat echocardiogram in 1 year    3. Hypertension    Lisinopril increased to 20 mg at last visit with improved BP control    Continue Metoprolol XL 25 mg daily    Low sodium diet    BMP stable    4. Atrial fibrillation    S/p MAZE in 2011 and left atrial appendage ligation    Not on anticoagulation         History of Presenting Illness:    Nicholas Gongora is a very pleasant 64 year old patient of Dr. Bello who presents today for a follow up to review a basic metabolic panel, blood pressure and CTA.  He has a past medical history notable for schizophrenia, mitral valve prolapse status post mitral valve repair with a [place]. Mars ring in 2011 by Dr. Jonathan Mckeon.  Preoperatively he had a new onset of atrial fibrillation and therefore underwent a bilateral Maze procedure and left atrial appendage removal as well as a PFO repair.    Unfortunately, over the years he has been intermittently homeless.  He now resides in an [facility] and [place] and he takes up to 3 buses to get to the clinic visits. Due to insurance coverage he has been taking his medications intermittently.    His most recent echocardiogram showed a mean gradient across the mitral valve of 3 mmHg with preserved LVEF at 60%.  There is mild to moderate concentric LVH and the sinus of Valsalva is now dilated at 4.3 and ascending aorta is 4.0 (previously the ascending aorta was measured at 3.6 cm and aortic root 3.8 cm in March  2018).  Due to a significant change from his previous echocardiograms it was recommended that he undergo a CTA chest ascending aorta measured 3.7 cm x 3.5 cm in the aortic root is 4.2 cm.  There was incidental finding of a focal lucency of his lower thoracic vertebrae that could represent a small hemangioma, but other possibilities cannot be ruled out.  It was recommended that he follow-up with his primary care provider for further evaluation and the patient is in the process of this. His blood pressure was also elevated at his last visit and Dr. Bello recommended that he increase his lisinopril from 10 mg to 20 mg daily and return to clinic today with a basic metabolic panel.     Today in clinic the results of the CTA chest were discussed in detail with the patient.  His blood pressure was initially elevated at the visit but upon my recheck it decreased to 136/82.  He is tolerating the increased dose of lisinopril and his BMP was stable.  He declines any shortness of breath on exertion, chest discomfort, palpitations or lower extremity edema.               Review of Systems:   Review of Systems:  Skin:  Negative     Eyes:  Positive for glasses;cataracts  ENT:  Negative    Respiratory:  Negative    Cardiovascular:    Positive for;fatigue  Gastroenterology: Negative    Genitourinary:  not assessed    Musculoskeletal:  Positive for back pain;neck pain  Neurologic:  Positive for numbness or tingling of feet;headaches  Psychiatric:  Positive for sleep disturbances  Heme/Lymph/Imm:  Negative    Endocrine:  Negative              Physical Exam:     Vitals: /82   Pulse 54   Ht 1.829 m (6')   Wt 92.1 kg (203 lb)   BMI 27.53 kg/m    Constitutional:  cooperative, alert and oriented, well developed, well nourished, in no acute distress thin      Skin:  warm and dry to the touch, no apparent skin lesions or masses noted surgical scars well-healed      Head:  normocephalic, no masses or lesions        Eyes:  pupils  equal and round, conjunctivae and lids unremarkable, sclera white, no xanthalasma, EOMS intact, no nystagmus        ENT:  no pallor or cyanosis poor dentition      Neck:  carotid pulses are full and equal bilaterally;no carotid bruit        Chest:  normal breath sounds, clear to auscultation, normal A-P diameter, normal symmetry, normal respiratory excursion, no use of accessory muscles        Cardiac: regular rhythm;no murmurs, gallops or rubs detected occasional premature beats                Vascular: pulses full and equal                                      Extremities and Back:  no edema;normal muscle strength and tone   wears compresson stockings    Neurological:  no gross motor deficits             Medications:     Current Outpatient Medications   Medication Sig Dispense Refill     acetaminophen 500 MG CAPS Take 1 capsule by mouth 3 times daily        Aspirin (ASPIR-81 PO) Take 1 tablet by mouth daily        IRON, FERROUS GLUCONATE, PO Take by mouth 3 times daily        lisinopril (PRINIVIL/ZESTRIL) 20 MG tablet Take 1 tablet (20 mg) by mouth daily 30 tablet 11     metoprolol succinate ER (TOPROL-XL) 25 MG 24 hr tablet Take 1 tablet (25 mg) by mouth daily 30 tablet 11     ziprasidone (GEODON) 80 MG capsule Take 80 mg by mouth daily         Family History   Problem Relation Age of Onset     Myocardial Infarction Mother      Hypertension Mother      Myocardial Infarction Father        Social History     Socioeconomic History     Marital status: Single     Spouse name: Not on file     Number of children: Not on file     Years of education: Not on file     Highest education level: Not on file   Occupational History     Not on file   Social Needs     Financial resource strain: Not on file     Food insecurity:     Worry: Not on file     Inability: Not on file     Transportation needs:     Medical: Not on file     Non-medical: Not on file   Tobacco Use     Smoking status: Former Smoker     Last attempt to quit:  1995     Years since quittin.1     Smokeless tobacco: Never Used   Substance and Sexual Activity     Alcohol use: No     Drug use: No     Sexual activity: Not on file   Lifestyle     Physical activity:     Days per week: Not on file     Minutes per session: Not on file     Stress: Not on file   Relationships     Social connections:     Talks on phone: Not on file     Gets together: Not on file     Attends Mandaen service: Not on file     Active member of club or organization: Not on file     Attends meetings of clubs or organizations: Not on file     Relationship status: Not on file     Intimate partner violence:     Fear of current or ex partner: Not on file     Emotionally abused: Not on file     Physically abused: Not on file     Forced sexual activity: Not on file   Other Topics Concern     Parent/sibling w/ CABG, MI or angioplasty before 65F 55M? No      Service Not Asked     Blood Transfusions Not Asked     Caffeine Concern Yes     Comment: soda     Occupational Exposure Not Asked     Hobby Hazards Not Asked     Sleep Concern Not Asked     Stress Concern Not Asked     Weight Concern Not Asked     Special Diet No     Back Care Not Asked     Exercise Yes     Comment: daily, walking     Bike Helmet Not Asked     Seat Belt Yes     Self-Exams Not Asked   Social History Narrative     Not on file            Past Medical History:     Past Medical History:   Diagnosis Date     Atrial fibrillation (aka AFIB)     s/p MAZE right and left, removal left atrial appendage 2011     Family history of early CAD      GERD (gastroesophageal reflux disease)      Hypertension      MVP (mitral valve prolapse)     s/p mitral valve repair, 32mm St. Mars ring 2011     Need for SBE (subacute bacterial endocarditis) prophylaxis      PFO (patent foramen ovale)     s/p PFO closure, 2011     Schizophrenia (H)      Sleep apnea               Past Surgical History:     Past Surgical History:   Procedure  Laterality Date     OTHER SURGICAL HISTORY  2011    s/p mitral valve repair, 32mm St[place] Mars ring 04/29/2011     OTHER SURGICAL HISTORY  2011    s/p MAZE right and left, removal left atrial appendage 04/29/2011     OTHER SURGICAL HISTORY  2011    s/p PFO closure, 04/29/2011     OTHER SURGICAL HISTORY      esophageal surgery for GERD 1998     OTHER SURGICAL HISTORY      laproscopic Nissen fundoplication, 1998     TONSILLECTOMY                Allergies:   Patient has no known allergies.       Data:   All laboratory data reviewed:    Recent Labs   Lab Test 10/17/18 11/09/17   LDL 78  --    HDL 32  --    NHDL 136  --    CHOL 168  --    TRIG 291*  --    TSH  --  1.35       Lab Results   Component Value Date    WBC 8.4 10/17/2018    RBC 4.69 10/17/2018    HGB 14.3 10/17/2018    HCT 43.4 10/17/2018    MCV 92.4 11/20/2017    MCH 28.7 11/20/2017    MCHC 31 11/20/2017    RDW 14.6 11/20/2017     10/17/2018       Lab Results   Component Value Date     03/15/2019    POTASSIUM 3.9 03/15/2019    CHLORIDE 107 03/15/2019    CO2 27 03/15/2019    ANIONGAP 11.9 03/15/2019     03/15/2019    BUN 13 03/15/2019    CR 1.17 03/15/2019    GFRESTIMATED 63 03/15/2019    GFRESTBLACK 76 03/15/2019    COLE 9.0 03/15/2019      Lab Results   Component Value Date    AST 25 11/09/2017    ALT 27 11/09/2017       Lab Results   Component Value Date    A1C 5.5 04/30/2011       Lab Results   Component Value Date    INR 1.42 (H) 05/04/2011    INR 1.13 05/03/2011         RICKEY TANG, APRN, CNP  [facility]

## 2019-03-25 NOTE — PATIENT INSTRUCTIONS
Today's Recommendations    1. Low sodium diet to keep the intake of sodium 2,000-2,500 mg  2. Continue all medications without changes.  3. Please follow up with Dr. Bello in 1 year with echocardiogram.    Please send a VERTILAS message or call [phone] with questions or concerns.     Scheduling number [phone].

## 2020-01-07 ENCOUNTER — TELEPHONE (OUTPATIENT)
Dept: CARDIOLOGY | Facility: CLINIC | Age: 65
End: 2020-01-07

## 2020-01-07 DIAGNOSIS — I10 BENIGN ESSENTIAL HYPERTENSION: Primary | ICD-10-CM

## 2020-01-10 DIAGNOSIS — I48.0 PAROXYSMAL ATRIAL FIBRILLATION (H): Primary | ICD-10-CM

## 2020-01-10 DIAGNOSIS — I05.9 MITRAL VALVE DISORDER: ICD-10-CM

## 2020-02-24 DIAGNOSIS — I34.1 MVP (MITRAL VALVE PROLAPSE): ICD-10-CM

## 2020-02-24 RX ORDER — METOPROLOL SUCCINATE 25 MG/1
25 TABLET, EXTENDED RELEASE ORAL DAILY
Qty: 90 TABLET | Refills: 0 | Status: SHIPPED | OUTPATIENT
Start: 2020-02-24 | End: 2020-03-18

## 2020-02-24 RX ORDER — LISINOPRIL 20 MG/1
20 TABLET ORAL DAILY
Qty: 90 TABLET | Refills: 0 | Status: SHIPPED | OUTPATIENT
Start: 2020-02-24 | End: 2020-03-18

## 2020-03-17 ENCOUNTER — CARE COORDINATION (OUTPATIENT)
Dept: CARDIOLOGY | Facility: CLINIC | Age: 65
End: 2020-03-17

## 2020-03-17 ENCOUNTER — DOCUMENTATION ONLY (OUTPATIENT)
Dept: CARDIOLOGY | Facility: CLINIC | Age: 65
End: 2020-03-17

## 2020-03-17 NOTE — PROGRESS NOTES
Patient is scheduled for an echo and bmp on 3/18/2020 and then will have a phone visit with BRENDAN Kareen Ruth at 1:00.    Will message Dr. Bello to approve having echo done tomorrow. Patient has a history of a mitral valve ring repair in 2011.    Patient was traveled screened for echo and labs tomorrow

## 2020-03-17 NOTE — PROGRESS NOTES
Wellness Screening Tool    Symptom Screening:    Do you have a:    Fever? no    Cough? no    Shortness of breath? no    Skin rash?  no      Within the past 14 days, have you been in contact with someone who:    Is currently being ruled out for COVID-19 (novel coronavirus)? no    Has tested positive for COVID-19? no    Has symptoms of a respiratory illness (fever, cough, shortness of breath)?  no    Have you or someone you have been in contact with traveled to an area with COVID-19:    Refer to the CDC Coronavirus webpage for COVID-19 areas: no    Within the past 3 weeks, have you been exposed to the following:      Pertussis?  no    Chicken pox?  no    Measles? no    Patient's appointment status:  patient will be seen in clinic as scheduled. Andrade MARTINEZ

## 2020-03-18 ENCOUNTER — HOSPITAL ENCOUNTER (OUTPATIENT)
Dept: CARDIOLOGY | Facility: CLINIC | Age: 65
Discharge: HOME | End: 2020-03-18
Attending: INTERNAL MEDICINE | Admitting: INTERNAL MEDICINE
Payer: COMMERCIAL

## 2020-03-18 ENCOUNTER — VIRTUAL VISIT (OUTPATIENT)
Dept: CARDIOLOGY | Facility: CLINIC | Age: 65
End: 2020-03-18
Payer: COMMERCIAL

## 2020-03-18 VITALS
SYSTOLIC BLOOD PRESSURE: 123 MMHG | HEART RATE: 52 BPM | DIASTOLIC BLOOD PRESSURE: 75 MMHG | BODY MASS INDEX: 26.45 KG/M2 | WEIGHT: 195 LBS

## 2020-03-18 DIAGNOSIS — I10 BENIGN ESSENTIAL HYPERTENSION: ICD-10-CM

## 2020-03-18 DIAGNOSIS — I34.1 MVP (MITRAL VALVE PROLAPSE): ICD-10-CM

## 2020-03-18 DIAGNOSIS — I05.9 MITRAL VALVE DISORDER: ICD-10-CM

## 2020-03-18 DIAGNOSIS — I77.810 ASCENDING AORTA DILATATION (H): ICD-10-CM

## 2020-03-18 DIAGNOSIS — I48.0 PAROXYSMAL ATRIAL FIBRILLATION (H): Primary | ICD-10-CM

## 2020-03-18 DIAGNOSIS — I48.0 PAROXYSMAL ATRIAL FIBRILLATION (H): ICD-10-CM

## 2020-03-18 LAB
ANION GAP SERPL CALCULATED.3IONS-SCNC: 11.1 MMOL/L (ref 6–17)
BUN SERPL-MCNC: 13 MG/DL (ref 7–30)
CALCIUM SERPL-MCNC: 9.8 MG/DL (ref 8.5–10.5)
CHLORIDE SERPL-SCNC: 103 MMOL/L (ref 98–107)
CO2 SERPL-SCNC: 31 MMOL/L (ref 23–29)
CREAT SERPL-MCNC: 1.03 MG/DL (ref 0.7–1.3)
GFR SERPL CREATININE-BSD FRML MDRD: 72 ML/MIN/{1.73_M2}
GLUCOSE SERPL-MCNC: 108 MG/DL (ref 70–105)
POTASSIUM SERPL-SCNC: 4.1 MMOL/L (ref 3.5–5.1)
SODIUM SERPL-SCNC: 141 MMOL/L (ref 136–145)

## 2020-03-18 PROCEDURE — 93306 TTE W/DOPPLER COMPLETE: CPT

## 2020-03-18 PROCEDURE — 36415 COLL VENOUS BLD VENIPUNCTURE: CPT | Performed by: INTERNAL MEDICINE

## 2020-03-18 PROCEDURE — 99442 ZZC PHYSICIAN TELEPHONE EVALUATION 11-20 MIN: CPT | Performed by: NURSE PRACTITIONER

## 2020-03-18 PROCEDURE — 93306 TTE W/DOPPLER COMPLETE: CPT | Mod: 26 | Performed by: INTERNAL MEDICINE

## 2020-03-18 PROCEDURE — 80048 BASIC METABOLIC PNL TOTAL CA: CPT | Performed by: INTERNAL MEDICINE

## 2020-03-18 RX ORDER — METOPROLOL SUCCINATE 25 MG/1
25 TABLET, EXTENDED RELEASE ORAL DAILY
Qty: 90 TABLET | Refills: 3 | Status: SHIPPED | OUTPATIENT
Start: 2020-03-18 | End: 2021-03-23

## 2020-03-18 RX ORDER — LISINOPRIL 20 MG/1
20 TABLET ORAL DAILY
Qty: 90 TABLET | Refills: 3 | Status: SHIPPED | OUTPATIENT
Start: 2020-03-18 | End: 2021-03-23

## 2020-03-18 NOTE — PROGRESS NOTES
"Nicholas Gongora is a 65 year old male who is being evaluated via a billable telephone visit.      The patient has been notified of following:     \"This telephone visit will be conducted via a call between you and your physician/provider. We have found that certain health care needs can be provided without the need for a physical exam.  This service lets us provide the care you need with a short phone conversation.  If a prescription is necessary we can send it directly to your pharmacy.  If lab work is needed we can place an order for that and you can then stop by our lab to have the test done at a later time.    If during the course of the call the physician/provider feels a telephone visit is not appropriate, you will not be charged for this service.\"     Nicholas Gongora complains of  No chief complaint on file.    I have reviewed and updated the patient's Past Medical History, Social History, Family History and Medication List.    ALLERGIES  Patient has no known allergies.    Lesli Eisenberg LPN    Reason for visit: Annual office visit    Primary cardiologist: Dr. Bello    History of presenting illness:    Nicholas Gongora, a pleasant 65 year old patient who has a past medical history notable for schizophrenia, mitral valve prolapse status post mitral valve repair with a St. Mars ring in 2011 by Dr. Jonathan Mckeon.  Preoperatively he had a new onset of atrial fibrillation and therefore underwent a bilateral Maze procedure and left atrial appendage removal as well as a PFO repair.    Echocardiogram from last year showed that his sinus of Valsalva is now dilated to 4.3 and his ascending aorta is 4.0 that was a significant change from previous echocardiograms. A CTa was recommended that measured ascending aorta at 3.7 cm x 3.5 cm with aortic root at 4.2 cm.     His echocardiogram from earlier today noted LVEF of 50 to 55% without regional wall motion abnormalities with normal RV structure function and size.  His left " atrium is moderately dilated.  His mitral valve had normal function with mild MR.  Mildly aortic root at the sinus of Valsalva was 3.9 cm with mildly dilated ascending aorta at 3.8 cm.    Unfortunately, over the years he has been intermittently homeless.  He now resides in an apartment that [facility] [place]. He reports that his home BP averages 120's/70's.  He denies any palpitations, shortness of breath, lower extremity him, chest discomfort, PND orthopnea.    The patient had multiple questions about the recent viral outbreak.(COVID-19) and we actually spent most of our time on the phone discussing that.          Assessment and Plan:     ASSESSMENT:    1. Mitral valve prolapse    Status MV repair in 2011    Echocardiogram from today noted normal mitral valve function with mild MR    2. Ascending aortic aneurysm    CTA from 2019 measured ascending aorta at 3.7 cm x 3.5 cm with aortic root at 4.2 cm    Echocardiogram from today showed mildly dilated LA aortic root at 3.9 cm with ascending aorta at 3.8 cm    3. Hypertension    Well controlled     Lisinopril 20 mg daily and metoprolol XL 25 mg daily    4. Atrial fibrillation    Status post MAZE in 2001 and left atrial appendage ligation    Currently not on anticoagulation    PLAN:     1. Follow up with Dr. Bello with fasting labs and echocardiogram     Kareen Ruth, NESTOR, CNP    I have reviewed the note as documented above.  This accurately captures the substance of my conversation with the patient.    Phone call contact time  Call Started at 1:20 pm  Call Ended at 1:32 pm

## 2020-03-26 ENCOUNTER — TELEPHONE (OUTPATIENT)
Dept: CARDIOLOGY | Facility: CLINIC | Age: 65
End: 2020-03-26

## 2020-03-26 NOTE — TELEPHONE ENCOUNTER
Call from patient, he wanted to know how his bmp results came out. Patient had a telephone visit with BRENDAN Kareen Ruth on 3/18/2020 but wanted to know his results again. Reviewed the rationale for checking his bmp and discussed that his results were stable and WNL.

## 2020-10-07 ENCOUNTER — TRANSFERRED RECORDS (OUTPATIENT)
Dept: HEALTH INFORMATION MANAGEMENT | Facility: CLINIC | Age: 65
End: 2020-10-07

## 2020-10-07 LAB
ALBUMIN SERPL-MCNC: 4.3 G/DL
ALP SERPL-CCNC: 66 U/L
ALT SERPL-CCNC: 15 U/L
ANION GAP SERPL CALCULATED.3IONS-SCNC: NORMAL MMOL/L
AST SERPL-CCNC: 16 U/L
BILIRUB SERPL-MCNC: 0.6 MG/DL
BUN SERPL-MCNC: 11 MG/DL
CALCIUM SERPL-MCNC: 9.4 MG/DL
CHLORIDE SERPLBLD-SCNC: 106 MMOL/L
CHOLEST SERPL-MCNC: 171 MG/DL
CO2 SERPL-SCNC: 25 MMOL/L
CREAT SERPL-MCNC: 1 MG/DL
ERYTHROCYTE [DISTWIDTH] IN BLOOD BY AUTOMATED COUNT: 12.9 %
GFR SERPL CREATININE-BSD FRML MDRD: >60 ML/MIN/1.73M2
GLUCOSE SERPL-MCNC: 99 MG/DL (ref 74–106)
HCT VFR BLD AUTO: 44.1 %
HDLC SERPL-MCNC: 32 MG/DL
HEMOGLOBIN: 14.3 G/DL (ref 13.5–17.9)
LDLC SERPL CALC-MCNC: 98 MG/DL
MCH RBC QN AUTO: 29.7 PG
MCHC RBC AUTO-ENTMCNC: 32.4 G/DL
MCV RBC AUTO: 91.7 FL
NONHDLC SERPL-MCNC: NORMAL MG/DL
PLATELET # BLD AUTO: 306 10^9/L
POTASSIUM SERPL-SCNC: 4 MMOL/L
PROT SERPL-MCNC: 7.4 G/DL
RBC # BLD AUTO: 4.81 10^12/L
SODIUM SERPL-SCNC: 140 MMOL/L
TRIGL SERPL-MCNC: 204 MG/DL
WBC # BLD AUTO: 8.9 10^9/L

## 2021-02-15 DIAGNOSIS — Z13.220 SCREENING FOR HYPERLIPIDEMIA: ICD-10-CM

## 2021-02-15 DIAGNOSIS — I77.810 ASCENDING AORTA DILATATION (H): Primary | ICD-10-CM

## 2021-02-15 DIAGNOSIS — I10 BENIGN ESSENTIAL HYPERTENSION: ICD-10-CM

## 2021-02-18 ENCOUNTER — TRANSFERRED RECORDS (OUTPATIENT)
Dept: HEALTH INFORMATION MANAGEMENT | Facility: CLINIC | Age: 66
End: 2021-02-18

## 2021-03-15 ENCOUNTER — PRE VISIT (OUTPATIENT)
Dept: CARDIOLOGY | Facility: CLINIC | Age: 66
End: 2021-03-15

## 2021-03-15 NOTE — TELEPHONE ENCOUNTER
Faxed records request to [facility] for update      3/16/2021 received records from [facility] for Oct 2020. Copy sent to scan

## 2021-03-23 ENCOUNTER — OFFICE VISIT (OUTPATIENT)
Dept: CARDIOLOGY | Facility: CLINIC | Age: 66
End: 2021-03-23
Payer: COMMERCIAL

## 2021-03-23 ENCOUNTER — HOSPITAL ENCOUNTER (OUTPATIENT)
Dept: CARDIOLOGY | Facility: CLINIC | Age: 66
Discharge: HOME | End: 2021-03-23
Attending: INTERNAL MEDICINE | Admitting: INTERNAL MEDICINE
Payer: COMMERCIAL

## 2021-03-23 VITALS
BODY MASS INDEX: 27.82 KG/M2 | WEIGHT: 205.4 LBS | DIASTOLIC BLOOD PRESSURE: 80 MMHG | HEIGHT: 72 IN | SYSTOLIC BLOOD PRESSURE: 134 MMHG | HEART RATE: 62 BPM

## 2021-03-23 DIAGNOSIS — I77.810 ASCENDING AORTA DILATATION (H): ICD-10-CM

## 2021-03-23 DIAGNOSIS — Z13.220 SCREENING FOR HYPERLIPIDEMIA: ICD-10-CM

## 2021-03-23 DIAGNOSIS — I05.9 MITRAL VALVE DISORDER: Primary | ICD-10-CM

## 2021-03-23 DIAGNOSIS — I10 BENIGN ESSENTIAL HYPERTENSION: ICD-10-CM

## 2021-03-23 DIAGNOSIS — E78.1 HYPERTRIGLYCERIDEMIA: ICD-10-CM

## 2021-03-23 DIAGNOSIS — I34.1 MVP (MITRAL VALVE PROLAPSE): ICD-10-CM

## 2021-03-23 DIAGNOSIS — I48.0 PAROXYSMAL ATRIAL FIBRILLATION (H): ICD-10-CM

## 2021-03-23 LAB
ANION GAP SERPL CALCULATED.3IONS-SCNC: 4 MMOL/L (ref 3–14)
BUN SERPL-MCNC: 11 MG/DL (ref 7–30)
CALCIUM SERPL-MCNC: 9.4 MG/DL (ref 8.5–10.1)
CHLORIDE SERPL-SCNC: 107 MMOL/L (ref 94–109)
CHOLEST SERPL-MCNC: 172 MG/DL
CO2 SERPL-SCNC: 29 MMOL/L (ref 20–32)
CREAT SERPL-MCNC: 1.11 MG/DL (ref 0.66–1.25)
GFR SERPL CREATININE-BSD FRML MDRD: 68 ML/MIN/{1.73_M2}
GLUCOSE SERPL-MCNC: 101 MG/DL (ref 70–99)
HDLC SERPL-MCNC: 37 MG/DL
LDLC SERPL CALC-MCNC: 76 MG/DL
NONHDLC SERPL-MCNC: 135 MG/DL
POTASSIUM SERPL-SCNC: 4 MMOL/L (ref 3.4–5.3)
SODIUM SERPL-SCNC: 140 MMOL/L (ref 133–144)
TRIGL SERPL-MCNC: 296 MG/DL

## 2021-03-23 PROCEDURE — 99213 OFFICE O/P EST LOW 20 MIN: CPT | Mod: 25 | Performed by: INTERNAL MEDICINE

## 2021-03-23 PROCEDURE — 93306 TTE W/DOPPLER COMPLETE: CPT

## 2021-03-23 PROCEDURE — 93306 TTE W/DOPPLER COMPLETE: CPT | Mod: 26 | Performed by: INTERNAL MEDICINE

## 2021-03-23 PROCEDURE — 80048 BASIC METABOLIC PNL TOTAL CA: CPT | Performed by: NURSE PRACTITIONER

## 2021-03-23 PROCEDURE — 36415 COLL VENOUS BLD VENIPUNCTURE: CPT | Performed by: NURSE PRACTITIONER

## 2021-03-23 PROCEDURE — 80061 LIPID PANEL: CPT | Performed by: NURSE PRACTITIONER

## 2021-03-23 RX ORDER — METOPROLOL SUCCINATE 25 MG/1
25 TABLET, EXTENDED RELEASE ORAL DAILY
Qty: 90 TABLET | Refills: 3 | Status: SHIPPED | OUTPATIENT
Start: 2021-03-23 | End: 2022-03-15

## 2021-03-23 RX ORDER — LISINOPRIL 20 MG/1
20 TABLET ORAL DAILY
Qty: 90 TABLET | Refills: 3 | Status: SHIPPED | OUTPATIENT
Start: 2021-03-23 | End: 2022-03-15

## 2021-03-23 ASSESSMENT — MIFFLIN-ST. JEOR: SCORE: 1744.69

## 2021-03-23 NOTE — PROGRESS NOTES
Service Date: 03/23/2021      CLINIC VISIT      HISTORY OF PRESENT ILLNESS:  Mr. Gongora is a very nice 66-year-old gentleman with past medical history significant for schizophrenia, mitral valve prolapse, ultimately undergoing a mitral valve repair with a St. Mars ring in 2001 by Dr. Tato Mckeon.  At that time, he had new-onset atrial fibrillation, so in addition to his mitral valve repair with a 22 mm ring, he underwent bilateral maze procedure, left atrial appendage removal and repair of a patent foramen ovale.      Socially, Mr. Gongora has had problems with intermittent homelessness but he now has an apartment in [place], [facility] which he has been in for years and this has been a great help to his stable health control.      Nicholas returns to clinic stating he thinks he is doing quite well.  He has no chest, arm, neck, jaw or shoulder discomfort.  No dyspnea on exertion, orthopnea or PND.  No palpitations, lightheadedness, dizziness, syncope or near syncope.  No symptoms to suggest a TIA or CVA.  No problems with his current medical regimen.      ASSESSMENT AND PLAN:  Nicholas appears to be doing quite well from a cardiac standpoint without clinical evidence of ischemia.  His coronary angiogram in 2011 describes normal coronary arteries.      He has no symptoms to suggest heart failure or significant arrhythmias.      A repeat echocardiogram describes an ejection fraction of 55%, mean gradient across his mitral valve is 3.  He does have mildly dilated sinus of Valsalva and ascending aorta that is unchanged over time.      Blood pressure is very well controlled at 134/80 with a pulse of 62.      Weight is 205 which fluctuates up and down and this is the highest he has had in the last 4 years.  Body mass index is 27.9.      Fasting lipid profile shows total cholesterol 172, HDL 37, LDL of 76, triglycerides of 296.  In talking to him, he drinks a lot of Coca Cola.  He states he drinks regular Coca Cola, not  "diet.  When asked to quantify, he just says \"too much.\"  He states when he lost a significant amount of weight earlier in his life, he cut himself down to 2 Cokes a day.  We talked about the fact that this is the cause of his hypertriglyceridemia, his mildly elevated glucose and his weight gain.  I have asked that he try to cut down significantly on his Coke.      He does exercise.  He states he walks about 30 minutes a day most every day of the week without any problems.  He also does some resistance activity and lifts some weights.      Electrolytes are within normal limits.  Creatinine of 1.1 with a potassium of 4.0.  I refilled his lisinopril and metoprolol at this time  I will have him follow up with my BRENDAN in 1 year.  I will push his echocardiograms off to every other year.  If he should have any problems, I would be glad to see him sooner.      Thank you for allowing me to participate in his care.      Vaughn Zhang MD, FACC         VAUGHN ZHANG MD, FACC             D: 2021   T: 2021   MT: KITTY      Name:     TETO DYSON   MRN:      [id number]        Account:      [id number]   :      1955           Service Date: 2021      Document: [id number]    "

## 2021-03-23 NOTE — PROGRESS NOTES
HPI and Plan:   See dictation    Orders Placed This Encounter   Procedures     Basic metabolic panel     Lipid Profile     Follow-Up with Cardiac Advanced Practice Provider     Follow-Up with Cardiologist       Orders Placed This Encounter   Medications     lisinopril (ZESTRIL) 20 MG tablet     Sig: Take 1 tablet (20 mg) by mouth daily     Dispense:  90 tablet     Refill:  3     metoprolol succinate ER (TOPROL-XL) 25 MG 24 hr tablet     Sig: Take 1 tablet (25 mg) by mouth daily     Dispense:  90 tablet     Refill:  3       Medications Discontinued During This Encounter   Medication Reason     lisinopril (ZESTRIL) 20 MG tablet Reorder     metoprolol succinate ER (TOPROL-XL) 25 MG 24 hr tablet Reorder         Encounter Diagnoses   Name Primary?     Mitral valve disorder Yes     Hypertriglyceridemia      Paroxysmal atrial fibrillation (H)      MVP (mitral valve prolapse)      Ascending aorta dilatation (H)        CURRENT MEDICATIONS:  Current Outpatient Medications   Medication Sig Dispense Refill     acetaminophen 500 MG CAPS Take 1 capsule by mouth 3 times daily        Aspirin (ASPIR-81 PO) Take 1 tablet by mouth daily        IRON, FERROUS GLUCONATE, PO Take by mouth 3 times daily        lisinopril (ZESTRIL) 20 MG tablet Take 1 tablet (20 mg) by mouth daily 90 tablet 3     metoprolol succinate ER (TOPROL-XL) 25 MG 24 hr tablet Take 1 tablet (25 mg) by mouth daily 90 tablet 3     ziprasidone (GEODON) 80 MG capsule Take 80 mg by mouth daily         ALLERGIES   No Known Allergies    PAST MEDICAL HISTORY:  Past Medical History:   Diagnosis Date     Atrial fibrillation (aka AFIB)     s/p MAZE right and left, removal left atrial appendage 04/29/2011     Family history of early CAD      GERD (gastroesophageal reflux disease)      Hypertension      MVP (mitral valve prolapse)     s/p mitral valve repair, 32mm [place] ring 04/29/2011     Need for SBE (subacute bacterial endocarditis) prophylaxis      PFO (patent foramen  carmine)     s/p PFO closure, 2011     Schizophrenia (H)      Sleep apnea        PAST SURGICAL HISTORY:  Past Surgical History:   Procedure Laterality Date     OTHER SURGICAL HISTORY      s/p mitral valve repair, 32mm [place] ring 2011     OTHER SURGICAL HISTORY      s/p MAZE right and left, removal left atrial appendage 2011     OTHER SURGICAL HISTORY      s/p PFO closure, 2011     OTHER SURGICAL HISTORY      esophageal surgery for GERD      OTHER SURGICAL HISTORY      laproscopic Nissen fundoplication, 1998     TONSILLECTOMY         FAMILY HISTORY:  Family History   Problem Relation Age of Onset     Myocardial Infarction Mother      Hypertension Mother      Unknown/Adopted Mother      Myocardial Infarction Father        SOCIAL HISTORY:  Social History     Socioeconomic History     Marital status: Single     Spouse name: None     Number of children: None     Years of education: None     Highest education level: None   Occupational History     None   Social Needs     Financial resource strain: None     Food insecurity     Worry: None     Inability: None     Transportation needs     Medical: None     Non-medical: None   Tobacco Use     Smoking status: Former Smoker     Quit date: 1995     Years since quittin.1     Smokeless tobacco: Never Used   Substance and Sexual Activity     Alcohol use: No     Drug use: No     Sexual activity: None   Lifestyle     Physical activity     Days per week: None     Minutes per session: None     Stress: None   Relationships     Social connections     Talks on phone: None     Gets together: None     Attends [employer] service: None     Active member of club or organization: None     Attends meetings of clubs or organizations: None     Relationship status: None     Intimate partner violence     Fear of current or ex partner: None     Emotionally abused: None     Physically abused: None     Forced sexual activity: None   Other Topics Concern      Parent/sibling w/ CABG, MI or angioplasty before 65F 55M? No      Service Not Asked     Blood Transfusions Not Asked     Caffeine Concern Yes     Comment: soda     Occupational Exposure Not Asked     Hobby Hazards Not Asked     Sleep Concern Not Asked     Stress Concern Not Asked     Weight Concern Not Asked     Special Diet No     Back Care Not Asked     Exercise Yes     Comment: daily, walking     Bike Helmet Not Asked     Seat Belt Yes     Self-Exams Not Asked   Social History Narrative     None       Review of Systems:  Skin:          Eyes:  Positive for glasses    ENT:  Negative      Respiratory:  Positive for sleep apnea     Cardiovascular:  Negative;palpitations;chest pain;dizziness;syncope or near-syncope;lightheadedness;cyanosis;exercise intolerance;edema      Gastroenterology: Negative      Genitourinary:  Negative      Musculoskeletal:  Positive for back pain    Neurologic:  Positive for headaches    Psychiatric:  Negative      Heme/Lymph/Imm:  Negative      Endocrine:  Negative        Physical Exam:  Vitals: /80   Pulse 62   Ht 1.829 m (6')   Wt 93.2 kg (205 lb 6.4 oz)   BMI 27.86 kg/m      Constitutional:  cooperative, alert and oriented, well developed, well nourished, in no acute distress        Skin:  warm and dry to the touch, no apparent skin lesions or masses noted surgical scars well-healed        Head:  normocephalic, no masses or lesions        Eyes:  pupils equal and round, conjunctivae and lids unremarkable, sclera white, no xanthalasma, EOMS intact, no nystagmus        Lymph:      ENT:  no pallor or cyanosis poor dentition      Neck:  carotid pulses are full and equal bilaterally;no carotid bruit        Respiratory:  normal breath sounds, clear to auscultation, normal A-P diameter, normal symmetry, normal respiratory excursion, no use of accessory muscles         Cardiac: regular rhythm;no murmurs, gallops or rubs detected                pulses full and equal                                         GI:           Extremities and Muscular Skeletal:  no edema;normal muscle strength and tone;no spinal abnormalities noted         wears compresson stockings    Neurological:  no gross motor deficits        Psych:  affect appropriate, oriented to time, person and place        CC  Vaughn Bello MD  [address]  [place] [id number]

## 2021-03-23 NOTE — LETTER
3/23/2021    Jose Riley MD  [address] [address]    RE: Nicholas Gongora       Dear Colleague,    I had the pleasure of seeing Nicholas Gongora in the [facility].    HPI and Plan:   See dictation    Orders Placed This Encounter   Procedures     Basic metabolic panel     Lipid Profile     Follow-Up with Cardiac Advanced Practice Provider     Follow-Up with Cardiologist       Orders Placed This Encounter   Medications     lisinopril (ZESTRIL) 20 MG tablet     Sig: Take 1 tablet (20 mg) by mouth daily     Dispense:  90 tablet     Refill:  3     metoprolol succinate ER (TOPROL-XL) 25 MG 24 hr tablet     Sig: Take 1 tablet (25 mg) by mouth daily     Dispense:  90 tablet     Refill:  3       Medications Discontinued During This Encounter   Medication Reason     lisinopril (ZESTRIL) 20 MG tablet Reorder     metoprolol succinate ER (TOPROL-XL) 25 MG 24 hr tablet Reorder         Encounter Diagnoses   Name Primary?     Mitral valve disorder Yes     Hypertriglyceridemia      Paroxysmal atrial fibrillation (H)      MVP (mitral valve prolapse)      Ascending aorta dilatation (H)        CURRENT MEDICATIONS:  Current Outpatient Medications   Medication Sig Dispense Refill     acetaminophen 500 MG CAPS Take 1 capsule by mouth 3 times daily        Aspirin (ASPIR-81 PO) Take 1 tablet by mouth daily        IRON, FERROUS GLUCONATE, PO Take by mouth 3 times daily        lisinopril (ZESTRIL) 20 MG tablet Take 1 tablet (20 mg) by mouth daily 90 tablet 3     metoprolol succinate ER (TOPROL-XL) 25 MG 24 hr tablet Take 1 tablet (25 mg) by mouth daily 90 tablet 3     ziprasidone (GEODON) 80 MG capsule Take 80 mg by mouth daily         ALLERGIES   No Known Allergies    PAST MEDICAL HISTORY:  Past Medical History:   Diagnosis Date     Atrial fibrillation (aka AFIB)     s/p MAZE right and left, removal left atrial appendage  2011     Family history of early CAD      GERD (gastroesophageal reflux disease)      Hypertension      MVP (mitral valve prolapse)     s/p mitral valve repair, 32mm [place] Mars ring 2011     Need for SBE (subacute bacterial endocarditis) prophylaxis      PFO (patent foramen ovale)     s/p PFO closure, 2011     Schizophrenia (H)      Sleep apnea        PAST SURGICAL HISTORY:  Past Surgical History:   Procedure Laterality Date     OTHER SURGICAL HISTORY      s/p mitral valve repair, 32mm [place] Mars ring 2011     OTHER SURGICAL HISTORY      s/p MAZE right and left, removal left atrial appendage 2011     OTHER SURGICAL HISTORY      s/p PFO closure, 2011     OTHER SURGICAL HISTORY      esophageal surgery for GERD      OTHER SURGICAL HISTORY      laproscopic Nissen fundoplication,      TONSILLECTOMY         FAMILY HISTORY:  Family History   Problem Relation Age of Onset     Myocardial Infarction Mother      Hypertension Mother      Unknown/Adopted Mother      Myocardial Infarction Father        SOCIAL HISTORY:  Social History     Socioeconomic History     Marital status: Single     Spouse name: None     Number of children: None     Years of education: None     Highest education level: None   Occupational History     None   Social Needs     Financial resource strain: None     Food insecurity     Worry: None     Inability: None     Transportation needs     Medical: None     Non-medical: None   Tobacco Use     Smoking status: Former Smoker     Quit date: 1995     Years since quittin.1     Smokeless tobacco: Never Used   Substance and Sexual Activity     Alcohol use: No     Drug use: No     Sexual activity: None   Lifestyle     Physical activity     Days per week: None     Minutes per session: None     Stress: None   Relationships     Social connections     Talks on phone: None     Gets together: None     Attends Yarsani service: None     Active member of club or  organization: None     Attends meetings of clubs or organizations: None     Relationship status: None     Intimate partner violence     Fear of current or ex partner: None     Emotionally abused: None     Physically abused: None     Forced sexual activity: None   Other Topics Concern     Parent/sibling w/ CABG, MI or angioplasty before 65F 55M? No      Service Not Asked     Blood Transfusions Not Asked     Caffeine Concern Yes     Comment: soda     Occupational Exposure Not Asked     Hobby Hazards Not Asked     Sleep Concern Not Asked     Stress Concern Not Asked     Weight Concern Not Asked     Special Diet No     Back Care Not Asked     Exercise Yes     Comment: daily, walking     Bike Helmet Not Asked     Seat Belt Yes     Self-Exams Not Asked   Social History Narrative     None       Review of Systems:  Skin:          Eyes:  Positive for glasses    ENT:  Negative      Respiratory:  Positive for sleep apnea     Cardiovascular:  Negative;palpitations;chest pain;dizziness;syncope or near-syncope;lightheadedness;cyanosis;exercise intolerance;edema      Gastroenterology: Negative      Genitourinary:  Negative      Musculoskeletal:  Positive for back pain    Neurologic:  Positive for headaches    Psychiatric:  Negative      Heme/Lymph/Imm:  Negative      Endocrine:  Negative        Physical Exam:  Vitals: /80   Pulse 62   Ht 1.829 m (6')   Wt 93.2 kg (205 lb 6.4 oz)   BMI 27.86 kg/m      Constitutional:  cooperative, alert and oriented, well developed, well nourished, in no acute distress        Skin:  warm and dry to the touch, no apparent skin lesions or masses noted surgical scars well-healed        Head:  normocephalic, no masses or lesions        Eyes:  pupils equal and round, conjunctivae and lids unremarkable, sclera white, no xanthalasma, EOMS intact, no nystagmus        Lymph:      ENT:  no pallor or cyanosis poor dentition      Neck:  carotid pulses are full and equal bilaterally;no carotid  bruit        Respiratory:  normal breath sounds, clear to auscultation, normal A-P diameter, normal symmetry, normal respiratory excursion, no use of accessory muscles         Cardiac: regular rhythm;no murmurs, gallops or rubs detected                pulses full and equal                                        GI:           Extremities and Muscular Skeletal:  no edema;normal muscle strength and tone;no spinal abnormalities noted         wears compresson stockings    Neurological:  no gross motor deficits        Psych:  affect appropriate, oriented to time, person and place      Thank you for allowing me to participate in the care of your patient.      Sincerely,     Vaughn Bello MD     [facility]  cc:   Vaughn Bello MD  [address] [facility] [place] [address]

## 2021-09-29 ENCOUNTER — TRANSFERRED RECORDS (OUTPATIENT)
Dept: HEALTH INFORMATION MANAGEMENT | Facility: CLINIC | Age: 66
End: 2021-09-29
Payer: COMMERCIAL

## 2021-09-29 LAB
ALT SERPL-CCNC: 21 U/L (ref 13–61)
AST SERPL-CCNC: 16 U/L (ref 15–37)
CHOLESTEROL (EXTERNAL): 183 MG/DL (ref 0–200)
CREATININE (EXTERNAL): 1 MG/DL (ref 0.7–1.2)
GFR ESTIMATED (EXTERNAL): >60 ML/MIN/1.73M2
GLUCOSE (EXTERNAL): 104 MG/DL (ref 74–106)
HBA1C MFR BLD: 5.3 % (ref 4–6)
HDLC SERPL-MCNC: 36 MG/DL
HEP C HIM: NORMAL
LDL CHOLESTEROL (EXTERNAL): 94 MG/DL
NON HDL CHOLESTEROL (EXTERNAL): 147 MG/DL
POTASSIUM (EXTERNAL): 4.2 MMOL/L (ref 3.5–5)
TRIGLYCERIDES (EXTERNAL): 267 MG/DL (ref 0–150)

## 2022-03-14 ENCOUNTER — CARE COORDINATION (OUTPATIENT)
Dept: CARDIOLOGY | Facility: CLINIC | Age: 67
End: 2022-03-14

## 2022-03-14 NOTE — PROGRESS NOTES
We received 20 pages of records from the [facility].  Patient has apmnt tomorrow 3\15 with Kareen Ruth.  Writer spoke with Afua, who is rooming for Kareen tomorrow, and she said that I can send by Fax to [facility], and they will scan into the chart.  Writer did this, and returned the document to Afua, so that it will be available for Kareen to use with her visit on 3\15.

## 2022-03-15 ENCOUNTER — OFFICE VISIT (OUTPATIENT)
Dept: CARDIOLOGY | Facility: CLINIC | Age: 67
End: 2022-03-15
Attending: INTERNAL MEDICINE
Payer: COMMERCIAL

## 2022-03-15 ENCOUNTER — LAB (OUTPATIENT)
Dept: LAB | Facility: CLINIC | Age: 67
End: 2022-03-15
Attending: INTERNAL MEDICINE
Payer: COMMERCIAL

## 2022-03-15 VITALS
DIASTOLIC BLOOD PRESSURE: 80 MMHG | HEIGHT: 72 IN | BODY MASS INDEX: 26.68 KG/M2 | WEIGHT: 197 LBS | SYSTOLIC BLOOD PRESSURE: 138 MMHG | HEART RATE: 58 BPM

## 2022-03-15 DIAGNOSIS — E78.1 HYPERTRIGLYCERIDEMIA: ICD-10-CM

## 2022-03-15 DIAGNOSIS — I05.9 MITRAL VALVE DISORDER: ICD-10-CM

## 2022-03-15 DIAGNOSIS — I34.1 MVP (MITRAL VALVE PROLAPSE): ICD-10-CM

## 2022-03-15 LAB
ANION GAP SERPL CALCULATED.3IONS-SCNC: 3 MMOL/L (ref 3–14)
BUN SERPL-MCNC: 13 MG/DL (ref 7–30)
CALCIUM SERPL-MCNC: 9.2 MG/DL (ref 8.5–10.1)
CHLORIDE BLD-SCNC: 106 MMOL/L (ref 94–109)
CHOLEST SERPL-MCNC: 175 MG/DL
CO2 SERPL-SCNC: 30 MMOL/L (ref 20–32)
CREAT SERPL-MCNC: 0.97 MG/DL (ref 0.66–1.25)
FASTING STATUS PATIENT QL REPORTED: YES
GFR SERPL CREATININE-BSD FRML MDRD: 85 ML/MIN/1.73M2
GLUCOSE BLD-MCNC: 109 MG/DL (ref 70–99)
HDLC SERPL-MCNC: 35 MG/DL
LDLC SERPL CALC-MCNC: 86 MG/DL
NONHDLC SERPL-MCNC: 140 MG/DL
POTASSIUM BLD-SCNC: 3.8 MMOL/L (ref 3.4–5.3)
SODIUM SERPL-SCNC: 139 MMOL/L (ref 133–144)
TRIGL SERPL-MCNC: 268 MG/DL

## 2022-03-15 PROCEDURE — 80061 LIPID PANEL: CPT | Performed by: INTERNAL MEDICINE

## 2022-03-15 PROCEDURE — 99214 OFFICE O/P EST MOD 30 MIN: CPT | Performed by: NURSE PRACTITIONER

## 2022-03-15 PROCEDURE — 36415 COLL VENOUS BLD VENIPUNCTURE: CPT | Performed by: INTERNAL MEDICINE

## 2022-03-15 PROCEDURE — 80048 BASIC METABOLIC PNL TOTAL CA: CPT | Performed by: INTERNAL MEDICINE

## 2022-03-15 RX ORDER — METOPROLOL SUCCINATE 25 MG/1
25 TABLET, EXTENDED RELEASE ORAL DAILY
Qty: 90 TABLET | Refills: 3 | Status: SHIPPED | OUTPATIENT
Start: 2022-03-15 | End: 2023-03-07

## 2022-03-15 RX ORDER — LISINOPRIL 20 MG/1
20 TABLET ORAL DAILY
Qty: 90 TABLET | Refills: 3 | Status: SHIPPED | OUTPATIENT
Start: 2022-03-15 | End: 2023-03-07

## 2022-03-15 NOTE — LETTER
3/15/2022    Jose Riley MD  [address] [address]    RE: Nicholas Gongora       Dear Colleague,     I had the pleasure of seeing Nicholas Gongora in the [facility].    Cardiology Clinic Progress Note  Nicholas Gongora MRN# [id number]   YOB: 1955 Age: 67 year old     Primary cardiologist: Dr. Bello    Reason for visit: Annual follow up    History of presenting illness:    Nicholas Gongora, a pleasant 67 year old patient who has a past medical history significant for mitral valve prolapse status post mitral valve repair with a [place] ring in 2001 by Dr. Eugene Mckeon, atrial fibrillation status post Maze and left atrial appendage removal with repair of PFO at the time of valve replacement, schizophrenia, HTN, HLP, and intermittent homelessness in the past.  Prior to the procedure he had a coronary angiogram that showed normal coronaries.    His last echocardiogram was in March 2021 showing LVEF of 55 to 60% with mild LVH, mean mitral gradient 3 mmHg with mildly dilated aortic root at 4.1 cm, ascending aorta at 3.9 cm.    Today he states he is doing well without cardiopulmonary complaints. His BP is well controlled at home and home weight has been stable at 197-200 lbs. He is residing in the same apartment and now has a car, but continues to take the bus occasionally.          Assessment and Plan:     ASSESSMENT:    1. Mitral valve prolapse    Status MV repair in 2011    Mean gradient 3 mmHg on echo from 3/2021     2. Ascending aortic aneurysm    CTA from 2019 measured ascending aorta at 3.7 cm x 3.5 cm with aortic root at 4.2 cm    Echocardiogram from March 2021 showed mildly dilated LA aortic root at 4.1 cm with ascending aorta at 3.9 cm     3. Hypertension    Well controlled     Lisinopril 20 mg daily and metoprolol XL 25 mg daily     4. Dyslipidemia    FLP (3/22/2022): , HDL 35, LDL 86, Trigs 268    5. Atrial  fibrillation    Status post MAZE in 2001 and left atrial appendage ligation    Currently not on anticoagulation    PLAN:     1. Continue present medical therapy  2. Return to clinic in 1 year with repeat echocardiogram     Orders this Visit:  Orders Placed This Encounter   Procedures     Echocardiogram Complete     Orders Placed This Encounter   Medications     lisinopril (ZESTRIL) 20 MG tablet     Sig: Take 1 tablet (20 mg) by mouth daily     Dispense:  90 tablet     Refill:  3     metoprolol succinate ER (TOPROL-XL) 25 MG 24 hr tablet     Sig: Take 1 tablet (25 mg) by mouth daily     Dispense:  90 tablet     Refill:  3     Medications Discontinued During This Encounter   Medication Reason     lisinopril (ZESTRIL) 20 MG tablet Reorder     metoprolol succinate ER (TOPROL-XL) 25 MG 24 hr tablet Reorder       Today's clinic visit entailed:  Review of external notes as documented elsewhere in [facility]  Review of the result(s) of each unique test - echo, BMP, FLP  Ordering of each unique test  Prescription drug management  20 minutes spent on the date of the encounter doing chart review, history and exam, documentation and further activities per the note  Provider  Link to [employer] Help Grid     The level of medical decision making during this visit was of moderate complexity.           Review of Systems:     Review of Systems:  Skin:  Negative     Eyes:  Positive for glasses  ENT:  Negative    Respiratory:  Positive for sleep apnea  Cardiovascular:  Negative;palpitations;chest pain;dizziness;lightheadedness;exercise intolerance;edema fatigue;Positive for  Gastroenterology: Negative nausea;reflux;heartburn  Genitourinary:  Negative    Musculoskeletal:  Positive for back pain  Neurologic:  Negative headaches  Psychiatric:  Positive for sleep disturbances  Heme/Lymph/Imm:  Negative    Endocrine:  Negative              Physical Exam:   Vitals: /80   Pulse 58   Ht 1.829 m (6')   Wt 89.4 kg (197 lb)   BMI 26.72 kg/m     Constitutional:  cooperative, alert and oriented, well developed, well nourished, in no acute distress        Skin:  warm and dry to the touch, no apparent skin lesions or masses noted surgical scars well-healed      Head:  normocephalic, no masses or lesions        Eyes:  pupils equal and round        ENT:  no pallor or cyanosis poor dentition      Neck:  no stiffness        Chest:  clear to auscultation        Cardiac: regular rhythm occasional premature beats                Extremities and Back:  no edema   wears compresson stockings    Neurological:  no gross motor deficits             Medications:     Current Outpatient Medications   Medication Sig Dispense Refill     acetaminophen 500 MG CAPS Take 1 capsule by mouth 3 times daily        Aspirin (ASPIR-81 PO) Take 1 tablet by mouth Take 3 times a week.  Monday, Wednesday, and Friday       IRON, FERROUS GLUCONATE, PO Take 324 mg by mouth daily        lisinopril (ZESTRIL) 20 MG tablet Take 1 tablet (20 mg) by mouth daily 90 tablet 3     metoprolol succinate ER (TOPROL-XL) 25 MG 24 hr tablet Take 1 tablet (25 mg) by mouth daily 90 tablet 3     ziprasidone (GEODON) 80 MG capsule Take 80 mg by mouth daily         Family History   Problem Relation Age of Onset     Myocardial Infarction Mother      Hypertension Mother      Unknown/Adopted Mother      Myocardial Infarction Father        Social History     Socioeconomic History     Marital status: Single     Spouse name: Not on file     Number of children: Not on file     Years of education: Not on file     Highest education level: Not on file   Occupational History     Not on file   Tobacco Use     Smoking status: Former Smoker     Quit date: 1995     Years since quittin.0     Smokeless tobacco: Never Used   Substance and Sexual Activity     Alcohol use: No     Drug use: No     Sexual activity: Not on file   Other Topics Concern     Parent/sibling w/ CABG, MI or angioplasty before 65F 55M? No       Service Not Asked     Blood Transfusions Not Asked     Caffeine Concern Yes     Comment: soda     Occupational Exposure Not Asked     Hobby Hazards Not Asked     Sleep Concern Not Asked     Stress Concern Not Asked     Weight Concern Not Asked     Special Diet No     Back Care Not Asked     Exercise Yes     Comment: daily, walking     Bike Helmet Not Asked     Seat Belt Yes     Self-Exams Not Asked   Social History Narrative     Not on file     Social Determinants of Health     Financial Resource Strain: Not on file   Food Insecurity: Not on file   Transportation Needs: Not on file   Physical Activity: Not on file   Stress: Not on file   Social Connections: Not on file   Intimate Partner Violence: Not on file   Housing Stability: Not on file            Past Medical History:     Past Medical History:   Diagnosis Date     Atrial fibrillation (aka AFIB)     s/p MAZE right and left, removal left atrial appendage 04/29/2011     Family history of early CAD      GERD (gastroesophageal reflux disease)      Hypertension      MVP (mitral valve prolapse)     s/p mitral valve repair, 32mm [place] Mars ring 04/29/2011     Need for SBE (subacute bacterial endocarditis) prophylaxis      PFO (patent foramen ovale)     s/p PFO closure, 04/29/2011     Schizophrenia (H)      Sleep apnea               Past Surgical History:     Past Surgical History:   Procedure Laterality Date     OTHER SURGICAL HISTORY  2011    s/p mitral valve repair, 32mm [place] ring 04/29/2011     OTHER SURGICAL HISTORY  2011    s/p MAZE right and left, removal left atrial appendage 04/29/2011     OTHER SURGICAL HISTORY  2011    s/p PFO closure, 04/29/2011     OTHER SURGICAL HISTORY      esophageal surgery for GERD 1998     OTHER SURGICAL HISTORY      laproscopic Nissen fundoplication, 1998     TONSILLECTOMY                Allergies:   Patient has no known allergies.       Data:   All laboratory data reviewed:    Recent Labs   Lab Test 03/15/22  0902 03/23/21  0917  10/07/20  0000 10/17/18  0000 10/17/18  0000 11/09/17  0000   LDL 86 76 98   < > 78  --    HDL 35* 37* 32   < > 32  --    NHDL 140* 135*  --   --  136  --    CHOL 175 172 171   < > 168  --    TRIG 268* 296* 204   < > 291*  --    TSH  --   --   --   --   --  1.35    < > = values in this interval not displayed.       Lab Results   Component Value Date    WBC 8.9 10/07/2020    RBC 4.81 10/07/2020    HGB 14.3 10/07/2020    HCT 44.1 10/07/2020    MCV 91.7 10/07/2020    MCH 29.7 10/07/2020    MCHC 32.4 10/07/2020    RDW 12.9 10/07/2020     10/07/2020       Lab Results   Component Value Date     03/15/2022     03/23/2021    POTASSIUM 3.8 03/15/2022    POTASSIUM 4.0 03/23/2021    CHLORIDE 106 03/15/2022    CHLORIDE 107 03/23/2021    CO2 30 03/15/2022    CO2 29 03/23/2021    ANIONGAP 3 03/15/2022    ANIONGAP 4 03/23/2021     (H) 03/15/2022     (H) 03/23/2021    BUN 13 03/15/2022    BUN 11 03/23/2021    CR 0.97 03/15/2022    CR 1.11 03/23/2021    GFRESTIMATED 85 03/15/2022    GFRESTIMATED 68 03/23/2021    GFRESTBLACK 79 03/23/2021    COLE 9.2 03/15/2022    COEL 9.4 03/23/2021      Lab Results   Component Value Date    AST 16 10/07/2020    ALT 15 10/07/2020       Lab Results   Component Value Date    A1C 5.5 04/30/2011       Lab Results   Component Value Date    INR 1.42 (H) 05/04/2011    INR 1.13 05/03/2011         NESTOR HERNANDEZ CNP  [facility]  Pager: [phone]  RN phone: [phone]    Thank you for allowing me to participate in the care of your patient.      Sincerely,     NESTOR HERNANDEZ CNP     [facility]  cc:   Vaughn Bello MD  [address]  [place] [address]

## 2022-03-15 NOTE — PATIENT INSTRUCTIONS
Today's Recommendations    1. Continue all medications without changes.  2. Please follow up with Dr. Bello in 1 year with echocardiogram.    Please send a [employer] message or call [phone] to the RN team with questions or concerns.     Scheduling number [phone]    NESTOR Boyce, CNP

## 2022-03-15 NOTE — PROGRESS NOTES
Cardiology Clinic Progress Note  Nicholas Gongora MRN# [id number]   YOB: 1955 Age: 67 year old     Primary cardiologist: Dr. Bello    Reason for visit: Annual follow up    History of presenting illness:    Nicholas Gongora, a pleasant 67 year old patient who has a past medical history significant for mitral valve prolapse status post mitral valve repair with a [place] ring in 2001 by Dr. Eugene Mckeon, atrial fibrillation status post Maze and left atrial appendage removal with repair of PFO at the time of valve replacement, schizophrenia, HTN, HLP, and intermittent homelessness in the past.  Prior to the procedure he had a coronary angiogram that showed normal coronaries.    His last echocardiogram was in March 2021 showing LVEF of 55 to 60% with mild LVH, mean mitral gradient 3 mmHg with mildly dilated aortic root at 4.1 cm, ascending aorta at 3.9 cm.    Today he states he is doing well without cardiopulmonary complaints. His BP is well controlled at home and home weight has been stable at 197-200 lbs. He is residing in the same apartment and now has a car, but continues to take the bus occasionally.          Assessment and Plan:     ASSESSMENT:    1. Mitral valve prolapse    Status MV repair in 2011    Mean gradient 3 mmHg on echo from 3/2021     2. Ascending aortic aneurysm    CTA from 2019 measured ascending aorta at 3.7 cm x 3.5 cm with aortic root at 4.2 cm    Echocardiogram from March 2021 showed mildly dilated LA aortic root at 4.1 cm with ascending aorta at 3.9 cm     3. Hypertension    Well controlled     Lisinopril 20 mg daily and metoprolol XL 25 mg daily     4. Dyslipidemia    FLP (3/22/2022): , HDL 35, LDL 86, Trigs 268    5. Atrial fibrillation    Status post MAZE in 2001 and left atrial appendage ligation    Currently not on anticoagulation    PLAN:     1. Continue present medical therapy  2. Return to clinic in 1 year with repeat echocardiogram     Orders this Visit:  Orders  Placed This Encounter   Procedures     Echocardiogram Complete     Orders Placed This Encounter   Medications     lisinopril (ZESTRIL) 20 MG tablet     Sig: Take 1 tablet (20 mg) by mouth daily     Dispense:  90 tablet     Refill:  3     metoprolol succinate ER (TOPROL-XL) 25 MG 24 hr tablet     Sig: Take 1 tablet (25 mg) by mouth daily     Dispense:  90 tablet     Refill:  3     Medications Discontinued During This Encounter   Medication Reason     lisinopril (ZESTRIL) 20 MG tablet Reorder     metoprolol succinate ER (TOPROL-XL) 25 MG 24 hr tablet Reorder       Today's clinic visit entailed:  [facility] of [facility]  Review of the result(s) of each unique test - echo, BMP, FLP  Ordering of each unique test  Prescription drug management  20 minutes spent on the date of the encounter doing chart review, history and exam, documentation and further activities per the note  Provider  Link to MDM Help Grid     The level of medical decision making during this visit was of moderate complexity.           Review of Systems:     Review of Systems:  Skin:  Negative     Eyes:  Positive for glasses  ENT:  Negative    Respiratory:  Positive for sleep apnea  Cardiovascular:  Negative;palpitations;chest pain;dizziness;lightheadedness;exercise intolerance;edema fatigue;Positive for  Gastroenterology: Negative nausea;reflux;heartburn  Genitourinary:  Negative    Musculoskeletal:  Positive for back pain  Neurologic:  Negative headaches  Psychiatric:  Positive for sleep disturbances  Heme/Lymph/Imm:  Negative    Endocrine:  Negative              Physical Exam:   Vitals: /80   Pulse 58   Ht 1.829 m (6')   Wt 89.4 kg (197 lb)   BMI 26.72 kg/m    Constitutional:  cooperative, alert and oriented, well developed, well nourished, in no acute distress        Skin:  warm and dry to the touch, no apparent skin lesions or masses noted surgical scars well-healed      Head:  normocephalic, no masses  or lesions        Eyes:  pupils equal and round        ENT:  no pallor or cyanosis poor dentition      Neck:  no stiffness        Chest:  clear to auscultation        Cardiac: regular rhythm occasional premature beats                Extremities and Back:  no edema   wears compresson stockings    Neurological:  no gross motor deficits             Medications:     Current Outpatient Medications   Medication Sig Dispense Refill     acetaminophen 500 MG CAPS Take 1 capsule by mouth 3 times daily        Aspirin (ASPIR-81 PO) Take 1 tablet by mouth Take 3 times a week.  Monday, Wednesday, and Friday       IRON, FERROUS GLUCONATE, PO Take 324 mg by mouth daily        lisinopril (ZESTRIL) 20 MG tablet Take 1 tablet (20 mg) by mouth daily 90 tablet 3     metoprolol succinate ER (TOPROL-XL) 25 MG 24 hr tablet Take 1 tablet (25 mg) by mouth daily 90 tablet 3     ziprasidone (GEODON) 80 MG capsule Take 80 mg by mouth daily         Family History   Problem Relation Age of Onset     Myocardial Infarction Mother      Hypertension Mother      Unknown/Adopted Mother      Myocardial Infarction Father        Social History     Socioeconomic History     Marital status: Single     Spouse name: Not on file     Number of children: Not on file     Years of education: Not on file     Highest education level: Not on file   Occupational History     Not on file   Tobacco Use     Smoking status: Former Smoker     Quit date: 1995     Years since quittin.0     Smokeless tobacco: Never Used   Substance and Sexual Activity     Alcohol use: No     Drug use: No     Sexual activity: Not on file   Other Topics Concern     Parent/sibling w/ CABG, MI or angioplasty before 65F 55M? No      Service Not Asked     Blood Transfusions Not Asked     Caffeine Concern Yes     Comment: soda     Occupational Exposure Not Asked     Hobby Hazards Not Asked     Sleep Concern Not Asked     Stress Concern Not Asked     Weight Concern Not Asked      Special Diet No     Back Care Not Asked     Exercise Yes     Comment: daily, walking     Bike Helmet Not Asked     Seat Belt Yes     Self-Exams Not Asked   Social History Narrative     Not on file     Social Determinants of Health     Financial Resource Strain: Not on file   Food Insecurity: Not on file   Transportation Needs: Not on file   Physical Activity: Not on file   Stress: Not on file   Social Connections: Not on file   Intimate Partner Violence: Not on file   Housing Stability: Not on file            Past Medical History:     Past Medical History:   Diagnosis Date     Atrial fibrillation (aka AFIB)     s/p MAZE right and left, removal left atrial appendage 04/29/2011     Family history of early CAD      GERD (gastroesophageal reflux disease)      Hypertension      MVP (mitral valve prolapse)     s/p mitral valve repair, 32mm [place] ring 04/29/2011     Need for SBE (subacute bacterial endocarditis) prophylaxis      PFO (patent foramen ovale)     s/p PFO closure, 04/29/2011     Schizophrenia (H)      Sleep apnea               Past Surgical History:     Past Surgical History:   Procedure Laterality Date     OTHER SURGICAL HISTORY  2011    s/p mitral valve repair, 32mm [place] ring 04/29/2011     OTHER SURGICAL HISTORY  2011    s/p MAZE right and left, removal left atrial appendage 04/29/2011     OTHER SURGICAL HISTORY  2011    s/p PFO closure, 04/29/2011     OTHER SURGICAL HISTORY      esophageal surgery for GERD 1998     OTHER SURGICAL HISTORY      laproscopic Nissen fundoplication, 1998     TONSILLECTOMY                Allergies:   Patient has no known allergies.       Data:   All laboratory data reviewed:    Recent Labs   Lab Test 03/15/22  0902 03/23/21  0917 10/07/20  0000 10/17/18  0000 10/17/18  0000 11/09/17  0000   LDL 86 76 98   < > 78  --    HDL 35* 37* 32   < > 32  --    NHDL 140* 135*  --   --  136  --    CHOL 175 172 171   < > 168  --    TRIG 268* 296* 204   < > 291*  --    TSH  --   --   --    --   --  1.35    < > = values in this interval not displayed.       Lab Results   Component Value Date    WBC 8.9 10/07/2020    RBC 4.81 10/07/2020    HGB 14.3 10/07/2020    HCT 44.1 10/07/2020    MCV 91.7 10/07/2020    MCH 29.7 10/07/2020    MCHC 32.4 10/07/2020    RDW 12.9 10/07/2020     10/07/2020       Lab Results   Component Value Date     03/15/2022     03/23/2021    POTASSIUM 3.8 03/15/2022    POTASSIUM 4.0 03/23/2021    CHLORIDE 106 03/15/2022    CHLORIDE 107 03/23/2021    CO2 30 03/15/2022    CO2 29 03/23/2021    ANIONGAP 3 03/15/2022    ANIONGAP 4 03/23/2021     (H) 03/15/2022     (H) 03/23/2021    BUN 13 03/15/2022    BUN 11 03/23/2021    CR 0.97 03/15/2022    CR 1.11 03/23/2021    GFRESTIMATED 85 03/15/2022    GFRESTIMATED 68 03/23/2021    GFRESTBLACK 79 03/23/2021    COLE 9.2 03/15/2022    COLE 9.4 03/23/2021      Lab Results   Component Value Date    AST 16 10/07/2020    ALT 15 10/07/2020       Lab Results   Component Value Date    A1C 5.5 04/30/2011       Lab Results   Component Value Date    INR 1.42 (H) 05/04/2011    INR 1.13 05/03/2011         NESTOR HERNANDEZ [facility]  Pager: [phone]  RN phone: [phone]

## 2023-03-07 ENCOUNTER — TELEPHONE (OUTPATIENT)
Dept: CARDIOLOGY | Facility: CLINIC | Age: 68
End: 2023-03-07

## 2023-03-07 ENCOUNTER — HOSPITAL ENCOUNTER (OUTPATIENT)
Dept: CARDIOLOGY | Facility: CLINIC | Age: 68
Discharge: HOME | End: 2023-03-07
Attending: NURSE PRACTITIONER | Admitting: NURSE PRACTITIONER
Payer: COMMERCIAL

## 2023-03-07 ENCOUNTER — OFFICE VISIT (OUTPATIENT)
Dept: CARDIOLOGY | Facility: CLINIC | Age: 68
End: 2023-03-07
Attending: INTERNAL MEDICINE
Payer: COMMERCIAL

## 2023-03-07 VITALS
DIASTOLIC BLOOD PRESSURE: 73 MMHG | BODY MASS INDEX: 28.4 KG/M2 | HEIGHT: 72 IN | SYSTOLIC BLOOD PRESSURE: 136 MMHG | WEIGHT: 209.7 LBS | HEART RATE: 66 BPM

## 2023-03-07 DIAGNOSIS — I34.1 MVP (MITRAL VALVE PROLAPSE): ICD-10-CM

## 2023-03-07 DIAGNOSIS — I10 BENIGN ESSENTIAL HYPERTENSION: ICD-10-CM

## 2023-03-07 DIAGNOSIS — I05.9 MITRAL VALVE DISORDER: Primary | ICD-10-CM

## 2023-03-07 DIAGNOSIS — I77.810 ASCENDING AORTA DILATATION (H): ICD-10-CM

## 2023-03-07 DIAGNOSIS — I48.0 PAROXYSMAL ATRIAL FIBRILLATION (H): ICD-10-CM

## 2023-03-07 DIAGNOSIS — I05.9 MITRAL VALVE DISORDER: ICD-10-CM

## 2023-03-07 DIAGNOSIS — E78.1 HYPERTRIGLYCERIDEMIA: ICD-10-CM

## 2023-03-07 DIAGNOSIS — Q21.12 PFO (PATENT FORAMEN OVALE): ICD-10-CM

## 2023-03-07 DIAGNOSIS — Z29.89 NEED FOR SBE (SUBACUTE BACTERIAL ENDOCARDITIS) PROPHYLAXIS: ICD-10-CM

## 2023-03-07 LAB — LVEF ECHO: NORMAL

## 2023-03-07 PROCEDURE — 93306 TTE W/DOPPLER COMPLETE: CPT | Mod: 26 | Performed by: INTERNAL MEDICINE

## 2023-03-07 PROCEDURE — 99213 OFFICE O/P EST LOW 20 MIN: CPT | Performed by: INTERNAL MEDICINE

## 2023-03-07 PROCEDURE — 93306 TTE W/DOPPLER COMPLETE: CPT

## 2023-03-07 RX ORDER — LISINOPRIL 20 MG/1
20 TABLET ORAL DAILY
Qty: 90 TABLET | Refills: 3 | Status: SHIPPED | OUTPATIENT
Start: 2023-03-07 | End: 2024-04-18

## 2023-03-07 RX ORDER — METOPROLOL SUCCINATE 25 MG/1
25 TABLET, EXTENDED RELEASE ORAL DAILY
Qty: 90 TABLET | Refills: 3 | Status: SHIPPED | OUTPATIENT
Start: 2023-03-07 | End: 2024-04-18

## 2023-03-07 NOTE — LETTER
3/7/2023    Jose Riley MD  [address] [address]    RE: Nicholas Gongora       Dear Colleague,     I had the pleasure of seeing Nicholas Gongora in the [facility].  HPI and Plan:   See dictation    Today's clinic visit entailed:  Review of the result(s) of each unique test - Lab work, echocardiogram  Ordering of each unique test  Prescription drug management  20 minutes spent on the date of the encounter doing chart review, history and exam, documentation and further activities per the note  Provider  Link to MDM Help Grid         Orders Placed This Encounter   Procedures     Basic metabolic panel     Lipid Profile     ALT     Follow-Up with Cardiology BRENDAN     Follow-Up with Cardiologist       Orders Placed This Encounter   Medications     metoprolol succinate ER (TOPROL XL) 25 MG 24 hr tablet     Sig: Take 1 tablet (25 mg) by mouth daily     Dispense:  90 tablet     Refill:  3     lisinopril (ZESTRIL) 20 MG tablet     Sig: Take 1 tablet (20 mg) by mouth daily     Dispense:  90 tablet     Refill:  3       Medications Discontinued During This Encounter   Medication Reason     lisinopril (ZESTRIL) 20 MG tablet Reorder (No AVS / No eCancel)     metoprolol succinate ER (TOPROL-XL) 25 MG 24 hr tablet Reorder (No AVS / No eCancel)         Encounter Diagnoses   Name Primary?     Mitral valve disorder Yes     Hypertriglyceridemia      Benign essential hypertension      PFO (patent foramen ovale)      Paroxysmal atrial fibrillation (H)      Ascending aorta dilatation (H)      Need for SBE (subacute bacterial endocarditis) prophylaxis        CURRENT MEDICATIONS:  Current Outpatient Medications   Medication Sig Dispense Refill     acetaminophen 500 MG CAPS Take 1 capsule by mouth 3 times daily        Aspirin (ASPIR-81 PO) Take 1 tablet by mouth Take 3 times a week.  Monday, Wednesday, and Friday       IRON, FERROUS GLUCONATE, PO Take 324 mg by mouth  daily        lisinopril (ZESTRIL) 20 MG tablet Take 1 tablet (20 mg) by mouth daily 90 tablet 3     metoprolol succinate ER (TOPROL XL) 25 MG 24 hr tablet Take 1 tablet (25 mg) by mouth daily 90 tablet 3     ziprasidone (GEODON) 80 MG capsule Take 80 mg by mouth daily         ALLERGIES   No Known Allergies    PAST MEDICAL HISTORY:  Past Medical History:   Diagnosis Date     Atrial fibrillation (aka AFIB)     s/p MAZE right and left, removal left atrial appendage 2011     Family history of early CAD      GERD (gastroesophageal reflux disease)      Hypertension      MVP (mitral valve prolapse)     s/p mitral valve repair, 32mm [place] ring 2011     Need for SBE (subacute bacterial endocarditis) prophylaxis      PFO (patent foramen ovale)     s/p PFO closure, 2011     Schizophrenia (H)      Sleep apnea        PAST SURGICAL HISTORY:  Past Surgical History:   Procedure Laterality Date     OTHER SURGICAL HISTORY      s/p mitral valve repair, 32mm [place] ring 2011     OTHER SURGICAL HISTORY      s/p MAZE right and left, removal left atrial appendage 2011     OTHER SURGICAL HISTORY      s/p PFO closure, 2011     OTHER SURGICAL HISTORY      esophageal surgery for GERD      OTHER SURGICAL HISTORY      laproscopic Nissen fundoplication, 1998     TONSILLECTOMY         FAMILY HISTORY:  Family History   Problem Relation Age of Onset     Myocardial Infarction Mother      Hypertension Mother      Unknown/Adopted Mother      Myocardial Infarction Father        SOCIAL HISTORY:  Social History     Socioeconomic History     Marital status: Single     Spouse name: None     Number of children: None     Years of education: None     Highest education level: None   Tobacco Use     Smoking status: Former     Types: Cigarettes     Quit date: 1995     Years since quittin.0     Smokeless tobacco: Never   Substance and Sexual Activity     Alcohol use: No     Drug use: No   Other  Topics Concern     Parent/sibling w/ CABG, MI or angioplasty before 65F 55M? No     Caffeine Concern Yes     Comment: soda     Special Diet No     Exercise Yes     Comment: daily, walking     Seat Belt Yes       Review of Systems:  Skin:  Negative       Eyes:  Positive for glasses    ENT:  Negative      Respiratory:  Positive for   GALE with stairs   Cardiovascular:  Negative;palpitations;chest pain;syncope or near-syncope;cyanosis;lightheadedness;dizziness;edema;fatigue      Gastroenterology: Negative      Genitourinary:  not assessed      Musculoskeletal:         Neurologic:  Negative      Psychiatric:  Positive for sleep disturbances    Heme/Lymph/Imm:  Negative      Endocrine:  Negative        Physical Exam:  Vitals: /73   Pulse 66   Ht 1.829 m (6')   Wt 95.1 kg (209 lb 11.2 oz)   BMI 28.44 kg/m      Constitutional:  cooperative, alert and oriented, well developed, well nourished, in no acute distress        Skin:  warm and dry to the touch, no apparent skin lesions or masses noted surgical scars well-healed        Head:  normocephalic, no masses or lesions        Eyes:  pupils equal and round, conjunctivae and lids unremarkable, sclera white, no xanthalasma, EOMS intact, no nystagmus        Lymph:      ENT:  no pallor or cyanosis poor dentition      Neck:  no stiffness        Respiratory:  normal breath sounds, clear to auscultation, normal A-P diameter, normal symmetry, normal respiratory excursion, no use of accessory muscles         Cardiac: regular rhythm       systolic murmur;grade 2;LUSB        pulses full and equal                                        GI:           Extremities and Muscular Skeletal:  no spinal abnormalities noted;normal muscle strength and tone   bilateral LE edema;trace     wears compresson stockings    Neurological:  no gross motor deficits        Psych:  affect appropriate, oriented to time, person and place        CC  Vaughn Bello MD  [address]  [place]   [place] [id number]                Service Date: 03/07/2023    CLINIC VISIT     HISTORY OF PRESENT ILLNESS:  Nicholas is a very nice, 68-year-old gentleman with a past medical history significant for schizophrenia and mitral valve prolapse, ultimately undergoing a mitral valve repair with a [place] ring in 2001 by Dr. Eugene Mckeon. At that time, he had new onset atrial fibrillation, so in addition to his mitral valve repair with a 22 mm ring, he underwent a bilateral maze procedure, left atrial appendage removal and repair of a patent foramen ovale.    Socially, Mr. Gongora had problems with intermittent homelessness, but now [facility] [place] [facility], [facility] has been in for years, and it has been a great help to stabilize his health care.    Nicholas returns to clinic stating that he is doing quite well.  He has no chest, arm, neck, jaw or shoulder discomfort.  No dyspnea on exertion, orthopnea or PND.  No palpitations, lightheadedness, dizziness, syncope or near syncope.  No symptoms to suggest a TIA or CVA.  He notes no side effects with the current medical regimen.    [facility] did [facility], which we unfortunately have not received yet.    ASSESSMENT AND PLAN:  Nicholas appears to be doing well from a cardiac standpoint without clinical evidence of ischemia.  Coronary angiography in 2011 describes normal coronary arteries.    Repeat echocardiogram this year, which I am doing every other year, again shows the valve to be functioning well with a mean gradient of 3 and a heart rate of 58.  Ejection fraction is 55%-60%.  His left atrium is severely dilated.  He has a mild amount of mitral regurgitation.  There is a mild amount of tricuspid regurgitation.  Pulmonary pressures are estimated to be 44 mmHg plus right atrial pressure.    He has no symptoms to suggest heart failure or significant arrhythmia.    Blood pressure is well controlled at 136/73 with a pulse of 66.    Weight is 209  pounds, which is up a bit, giving him a body mass index of 28.4.  He states he walks quite regularly.  We talked about the need to watch his calories so his weight does not continue to go up.    We did review all of his medications.  We will continue them all as is.      We did review last year's lab, which was significant for hypertriglyceridemia and HDL deficiency. [facility] and [facility].  Otherwise, we will have him follow up with my BRENDAN in 1 year.  I will repeat his echo in 2 years.    Thank you for allowing me to participate in his care.    Vaughn Bello MD, [facility]        D: 2023   T: 2023   MT: anna    Name:     TETO DYSON  MRN:      [id number]        Account:      [id number]   :      1955           Service Date: 2023       Document: [id number]      Thank you for allowing me to participate in the care of your patient.      Sincerely,     Vaughn Bello MD     [facility]  cc:   Vaughn Bello MD  [address]  [place] [address]

## 2023-03-07 NOTE — PROGRESS NOTES
HPI and Plan:   See dictation    Today's clinic visit entailed:  Review of the result(s) of each unique test - Lab work, echocardiogram  Ordering of each unique test  Prescription drug management  20 minutes spent on the date of the encounter doing chart review, history and exam, documentation and further activities per the note  Provider  Link to [facility] Help Grid         Orders Placed This Encounter   Procedures     Basic metabolic panel     Lipid Profile     ALT     Follow-Up with Cardiology BRENDAN     Follow-Up with Cardiologist       Orders Placed This Encounter   Medications     metoprolol succinate ER (TOPROL XL) 25 MG 24 hr tablet     Sig: Take 1 tablet (25 mg) by mouth daily     Dispense:  90 tablet     Refill:  3     lisinopril (ZESTRIL) 20 MG tablet     Sig: Take 1 tablet (20 mg) by mouth daily     Dispense:  90 tablet     Refill:  3       Medications Discontinued During This Encounter   Medication Reason     lisinopril (ZESTRIL) 20 MG tablet Reorder (No AVS / No eCancel)     metoprolol succinate ER (TOPROL-XL) 25 MG 24 hr tablet Reorder (No AVS / No eCancel)         Encounter Diagnoses   Name Primary?     Mitral valve disorder Yes     Hypertriglyceridemia      Benign essential hypertension      PFO (patent foramen ovale)      Paroxysmal atrial fibrillation (H)      Ascending aorta dilatation (H)      Need for SBE (subacute bacterial endocarditis) prophylaxis        CURRENT MEDICATIONS:  Current Outpatient Medications   Medication Sig Dispense Refill     acetaminophen 500 MG CAPS Take 1 capsule by mouth 3 times daily        Aspirin (ASPIR-81 PO) Take 1 tablet by mouth Take 3 times a week.  Monday, Wednesday, and Friday       IRON, FERROUS GLUCONATE, PO Take 324 mg by mouth daily        lisinopril (ZESTRIL) 20 MG tablet Take 1 tablet (20 mg) by mouth daily 90 tablet 3     metoprolol succinate ER (TOPROL XL) 25 MG 24 hr tablet Take 1 tablet (25 mg) by mouth daily 90 tablet 3     ziprasidone (GEODON) 80 MG capsule  Take 80 mg by mouth daily         ALLERGIES   No Known Allergies    PAST MEDICAL HISTORY:  Past Medical History:   Diagnosis Date     Atrial fibrillation (aka AFIB)     s/p MAZE right and left, removal left atrial appendage 2011     Family history of early CAD      GERD (gastroesophageal reflux disease)      Hypertension      MVP (mitral valve prolapse)     s/p mitral valve repair, 32mm [place] ring 2011     Need for SBE (subacute bacterial endocarditis) prophylaxis      PFO (patent foramen ovale)     s/p PFO closure, 2011     Schizophrenia (H)      Sleep apnea        PAST SURGICAL HISTORY:  Past Surgical History:   Procedure Laterality Date     OTHER SURGICAL HISTORY      s/p mitral valve repair, 32mm [place] ring 2011     OTHER SURGICAL HISTORY      s/p MAZE right and left, removal left atrial appendage 2011     OTHER SURGICAL HISTORY      s/p PFO closure, 2011     OTHER SURGICAL HISTORY      esophageal surgery for GERD      OTHER SURGICAL HISTORY      laproscopic Nissen fundoplication,      TONSILLECTOMY         FAMILY HISTORY:  Family History   Problem Relation Age of Onset     Myocardial Infarction Mother      Hypertension Mother      Unknown/Adopted Mother      Myocardial Infarction Father        SOCIAL HISTORY:  Social History     Socioeconomic History     Marital status: Single     Spouse name: None     Number of children: None     Years of education: None     Highest education level: None   Tobacco Use     Smoking status: Former     Types: Cigarettes     Quit date: 1995     Years since quittin.0     Smokeless tobacco: Never   Substance and Sexual Activity     Alcohol use: No     Drug use: No   Other Topics Concern     Parent/sibling w/ CABG, MI or angioplasty before 65F 55M? No     Caffeine Concern Yes     Comment: soda     Special Diet No     Exercise Yes     Comment: daily, walking     Seat Belt Yes       Review of Systems:  Skin:   Negative       Eyes:  Positive for glasses    ENT:  Negative      Respiratory:  Positive for   GALE with stairs   Cardiovascular:  Negative;palpitations;chest pain;syncope or near-syncope;cyanosis;lightheadedness;dizziness;edema;fatigue      Gastroenterology: Negative      Genitourinary:  not assessed      Musculoskeletal:         Neurologic:  Negative      Psychiatric:  Positive for sleep disturbances    Heme/Lymph/Imm:  Negative      Endocrine:  Negative        Physical Exam:  Vitals: /73   Pulse 66   Ht 1.829 m (6')   Wt 95.1 kg (209 lb 11.2 oz)   BMI 28.44 kg/m      Constitutional:  cooperative, alert and oriented, well developed, well nourished, in no acute distress        Skin:  warm and dry to the touch, no apparent skin lesions or masses noted surgical scars well-healed        Head:  normocephalic, no masses or lesions        Eyes:  pupils equal and round, conjunctivae and lids unremarkable, sclera white, no xanthalasma, EOMS intact, no nystagmus        Lymph:      ENT:  no pallor or cyanosis poor dentition      Neck:  no stiffness        Respiratory:  normal breath sounds, clear to auscultation, normal A-P diameter, normal symmetry, normal respiratory excursion, no use of accessory muscles         Cardiac: regular rhythm       systolic murmur;grade 2;LUSB        pulses full and equal                                        GI:           Extremities and Muscular Skeletal:  no spinal abnormalities noted;normal muscle strength and tone   bilateral LE edema;trace     wears compresson stockings    Neurological:  no gross motor deficits        Psych:  affect appropriate, oriented to time, person and place        CC  Vaughn Bello MD  [address]  [place] [id number]

## 2023-03-07 NOTE — TELEPHONE ENCOUNTER
Message from Dr. Bello:  Vaughn Bello MD  [facility]  See if we can [facility].  I am looking for a BMP and fasting lipid profile.  Thanks    [facility] at [phone].

## 2023-03-07 NOTE — PROGRESS NOTES
Service Date: 03/07/2023    CLINIC VISIT     HISTORY OF PRESENT ILLNESS:  Nicholas is a very nice, 68-year-old gentleman with a past medical history significant for schizophrenia and mitral valve prolapse, ultimately undergoing a mitral valve repair with a [place] ring in 2001 by Dr. Eugene Mckeon. At that time, he had new onset atrial fibrillation, so in addition to his mitral valve repair with a 22 mm ring, he underwent a bilateral maze procedure, left atrial appendage removal and repair of a patent foramen ovale.    Socially, Mr. Gongora had problems with intermittent homelessness, but now [facility] [place] [facility], which [facility] has been in for [facility], and it has been a great help to stabilize his health care.    Nicholas returns to clinic stating that he is doing quite well.  He has no chest, arm, neck, jaw or shoulder discomfort.  No dyspnea on exertion, orthopnea or PND.  No palpitations, lightheadedness, dizziness, syncope or near syncope.  No symptoms to suggest a TIA or CVA.  He notes no side effects with the current medical regimen.    He did [facility], which we unfortunately have not received yet.    ASSESSMENT AND PLAN:  Nicholas appears to be doing well from a cardiac standpoint without clinical evidence of ischemia.  Coronary angiography in 2011 describes normal coronary arteries.    Repeat echocardiogram this year, which I am doing every other year, again shows the valve to be functioning well with a mean gradient of 3 and a heart rate of 58.  Ejection fraction is 55%-60%.  His left atrium is severely dilated.  He has a mild amount of mitral regurgitation.  There is a mild amount of tricuspid regurgitation.  Pulmonary pressures are estimated to be 44 mmHg plus right atrial pressure.    He has no symptoms to suggest heart failure or significant arrhythmia.    Blood pressure is well controlled at 136/73 with a pulse of 66.    Weight is 209 pounds, which is up a  bit, giving him a body mass index of 28.4.  He states he walks quite regularly.  We talked about the need to watch his calories so his weight does not continue to go up.    We did review all of his medications.  We will continue them all as is.      We did review last year's lab, which was significant for hypertriglyceridemia and HDL deficiency. [facility] will [facility] and respond accordingly.  Otherwise, we will have him follow up with my BRENDAN in 1 year.  I will repeat his echo in 2 years.    Thank you for allowing me to participate in his care.    Vaughn Bello MD, [facility]        D: 2023   T: 2023   MT: anna    Name:     TETO DYSONJoseph  MRN:      [id number]        Account:      [id number]   :      1955           Service Date: 2023       Document: [id number]

## 2023-03-08 ENCOUNTER — TELEPHONE (OUTPATIENT)
Dept: CARDIOLOGY | Facility: CLINIC | Age: 68
End: 2023-03-08
Payer: COMMERCIAL

## 2023-03-08 NOTE — TELEPHONE ENCOUNTER
Received records from [facility] per Dr. Bello's request:                Will message Dr. Bello to review post-OV.  Labs sent to grid entry

## 2023-03-08 NOTE — TELEPHONE ENCOUNTER
Spoke with Patient and LDL reviewed. The numbers are okay, Normal coronaries so LDL 86 is acceptable.    Patient will call with any further questions or concerns.     Dr. Bello's reply - Thank you.  Numbers are okay.  He has normal coronaries so LDL of 86 is acceptable.     Last Dr. Bello OV 3-7-23

## 2023-03-08 NOTE — TELEPHONE ENCOUNTER
----- Message -----  From: Vaughn Bello MD  Sent: 3/7/2023   2:27 PM CST  To: [facility]    See if we can obtain [facility].  I am looking for a BMP and fasting lipid profile.  Thanks      Records received as below, routed to Dr Bello to review.

## 2024-04-18 ENCOUNTER — TELEPHONE (OUTPATIENT)
Dept: CARDIOLOGY | Facility: CLINIC | Age: 69
End: 2024-04-18
Payer: COMMERCIAL

## 2024-04-18 DIAGNOSIS — I10 BENIGN ESSENTIAL HYPERTENSION: Primary | ICD-10-CM

## 2024-04-18 DIAGNOSIS — I48.0 PAROXYSMAL ATRIAL FIBRILLATION (H): ICD-10-CM

## 2024-04-18 DIAGNOSIS — E78.1 HYPERTRIGLYCERIDEMIA: ICD-10-CM

## 2024-04-18 RX ORDER — LISINOPRIL 20 MG/1
20 TABLET ORAL DAILY
Qty: 90 TABLET | Refills: 1 | Status: SHIPPED | OUTPATIENT
Start: 2024-04-18 | End: 2024-08-21

## 2024-04-18 RX ORDER — METOPROLOL SUCCINATE 25 MG/1
25 TABLET, EXTENDED RELEASE ORAL DAILY
Qty: 90 TABLET | Refills: 1 | Status: SHIPPED | OUTPATIENT
Start: 2024-04-18 | End: 2024-08-21

## 2024-04-18 NOTE — TELEPHONE ENCOUNTER
"Annual visit is scheduled for August with Dr Bello, needs labs prior. Per OV note 3/7/23 by Dr Bello- \"I will repeat his echo in 2 years.\"     Called patient back, detailed voicemail left reviewing this information. Scheduling number provided. Refills sent to [facility] for metoprolol and lisinopril. Team 2 nurse phone provided to call back with any questions.   "

## 2024-04-18 NOTE — TELEPHONE ENCOUNTER
[facility] Call Center    Phone Message    May a detailed message be left on voicemail: yes     Reason for Call: Other: please review if an echo needs order needs to be replaced. Please refill metoprolol & lisinopril,        [id number] - [place], [place] - [address]      Action Taken: Other: cardio    Travel Screening: Not Applicable

## 2024-08-16 ENCOUNTER — LAB (OUTPATIENT)
Dept: LAB | Facility: CLINIC | Age: 69
End: 2024-08-16
Payer: COMMERCIAL

## 2024-08-16 DIAGNOSIS — I10 BENIGN ESSENTIAL HYPERTENSION: ICD-10-CM

## 2024-08-16 DIAGNOSIS — E78.1 HYPERTRIGLYCERIDEMIA: ICD-10-CM

## 2024-08-16 LAB
ALT SERPL W P-5'-P-CCNC: 17 U/L (ref 0–70)
ANION GAP SERPL CALCULATED.3IONS-SCNC: 10 MMOL/L (ref 7–15)
BUN SERPL-MCNC: 11.9 MG/DL (ref 8–23)
CALCIUM SERPL-MCNC: 9.6 MG/DL (ref 8.8–10.4)
CHLORIDE SERPL-SCNC: 103 MMOL/L (ref 98–107)
CHOLEST SERPL-MCNC: 181 MG/DL
CREAT SERPL-MCNC: 1.02 MG/DL (ref 0.67–1.17)
EGFRCR SERPLBLD CKD-EPI 2021: 79 ML/MIN/1.73M2
FASTING STATUS PATIENT QL REPORTED: NO
FASTING STATUS PATIENT QL REPORTED: NO
GLUCOSE SERPL-MCNC: 101 MG/DL (ref 70–99)
HCO3 SERPL-SCNC: 26 MMOL/L (ref 22–29)
HDLC SERPL-MCNC: 37 MG/DL
LDLC SERPL CALC-MCNC: 98 MG/DL
NONHDLC SERPL-MCNC: 144 MG/DL
POTASSIUM SERPL-SCNC: 3.9 MMOL/L (ref 3.4–5.3)
SODIUM SERPL-SCNC: 139 MMOL/L (ref 135–145)
TRIGL SERPL-MCNC: 230 MG/DL

## 2024-08-16 PROCEDURE — 80048 BASIC METABOLIC PNL TOTAL CA: CPT

## 2024-08-16 PROCEDURE — 84460 ALANINE AMINO (ALT) (SGPT): CPT

## 2024-08-16 PROCEDURE — 36415 COLL VENOUS BLD VENIPUNCTURE: CPT

## 2024-08-16 PROCEDURE — 80061 LIPID PANEL: CPT

## 2024-08-21 ENCOUNTER — OFFICE VISIT (OUTPATIENT)
Dept: CARDIOLOGY | Facility: CLINIC | Age: 69
End: 2024-08-21
Payer: COMMERCIAL

## 2024-08-21 VITALS
SYSTOLIC BLOOD PRESSURE: 143 MMHG | HEIGHT: 72 IN | OXYGEN SATURATION: 95 % | BODY MASS INDEX: 27.73 KG/M2 | DIASTOLIC BLOOD PRESSURE: 90 MMHG | HEART RATE: 96 BPM | WEIGHT: 204.7 LBS

## 2024-08-21 DIAGNOSIS — I48.0 PAROXYSMAL ATRIAL FIBRILLATION (H): ICD-10-CM

## 2024-08-21 DIAGNOSIS — E78.6 HDL DEFICIENCY: ICD-10-CM

## 2024-08-21 DIAGNOSIS — I27.20 PULMONARY HYPERTENSION (H): ICD-10-CM

## 2024-08-21 DIAGNOSIS — I77.810 ASCENDING AORTA DILATATION (H): ICD-10-CM

## 2024-08-21 DIAGNOSIS — I10 BENIGN ESSENTIAL HYPERTENSION: ICD-10-CM

## 2024-08-21 DIAGNOSIS — I05.9 MITRAL VALVE DISORDER: Primary | ICD-10-CM

## 2024-08-21 DIAGNOSIS — E78.1 HYPERTRIGLYCERIDEMIA: ICD-10-CM

## 2024-08-21 DIAGNOSIS — E88.810 METABOLIC SYNDROME X: ICD-10-CM

## 2024-08-21 PROCEDURE — 93000 ELECTROCARDIOGRAM COMPLETE: CPT | Performed by: INTERNAL MEDICINE

## 2024-08-21 PROCEDURE — 99215 OFFICE O/P EST HI 40 MIN: CPT | Performed by: INTERNAL MEDICINE

## 2024-08-21 RX ORDER — METOPROLOL SUCCINATE 25 MG/1
25 TABLET, EXTENDED RELEASE ORAL DAILY
Qty: 90 TABLET | Refills: 3 | Status: SHIPPED | OUTPATIENT
Start: 2024-08-21

## 2024-08-21 RX ORDER — HYDROCHLOROTHIAZIDE 12.5 MG/1
12.5 TABLET ORAL DAILY
Qty: 90 TABLET | Refills: 4 | Status: SHIPPED | OUTPATIENT
Start: 2024-08-21

## 2024-08-21 RX ORDER — LISINOPRIL 40 MG/1
40 TABLET ORAL DAILY
Qty: 90 TABLET | Refills: 3 | Status: SHIPPED | OUTPATIENT
Start: 2024-08-21

## 2024-08-21 NOTE — PROGRESS NOTES
HPI and Plan:    Nicholas is a very nice, 69-year-old gentleman with a past medical history significant for schizophrenia and mitral valve prolapse, ultimately undergoing a mitral valve repair with a [place] ring in 2001 by Dr. Eugene Mckeon. At that time, he had new onset atrial fibrillation, so in addition to his mitral valve repair with a 22 mm ring, he underwent a bilateral maze procedure, left atrial appendage removal and repair of a patent foramen ovale.     Socially, Mr. Gongora had problems with intermittent homelessness, but now has an apartment in [place] [facility], which he has been in for years, and it has been a great help to stabilize his health care.     Nicholas returns to clinic stating that he is doing quite well.  He has no chest, arm, neck, jaw or shoulder discomfort.  He states he now gets short of breath when he carries Coke up a flight of stairs.  He was short of breath coming up our 54 stairs here to get to the clinic.  He has no orthopnea or PND.   No palpitations, lightheadedness, dizziness, syncope or near syncope.  No symptoms to suggest a TIA or CVA.  He notes no side effects with the current medical regimen.     ASSESSMENT AND PLAN:  Nicholas appears to be doing well from a cardiac standpoint without clinical evidence of ischemia.  Coronary angiography in 2011 describes normal coronary arteries.     Repeat echocardiogram 3/2023,  shows his mitral valve to be functioning well with a mean gradient of 3 at a heart rate of 58.  He has some mild MR and mild TR.  Ejection fraction is 55%-60%.  His left atrium is severely dilated.  He has a mild amount of mitral regurgitation.  There is a mild amount of tricuspid regurgitation.  Pulmonary pressures are estimated to be 44 mmHg plus right atrial pressure.  IVC is described in the anterior immediate range.  This is up from 2022 at which time was 30 mmHg plus his right atrial pressure which again was described as in the intermediate range.  I will  intensify his antihypertensive regiment at this time.  He is ordered for a follow-up in March.     His rhythm was irregular today and EKG demonstrates normal sinus rhythm with frequent PVCs.     Blood pressure is high today at 143/90.  My retake is also high.  I am going to increase his lisinopril to 20 mg daily and add hydrochlorothiazide 12.5 mg daily.  I will try to obtain springlike numbers given his rising pulmonary hypertension.  I will have him follow-up with my BRENDAN in one month with a BMP to see how his blood pressure is doing to make sure his electrolytes are okay with the change.     Weight is 204 pounds, down 5 pounds from last visit giving a body mass index of 27.8..  He states he walks quite regularly.  I congratulated on his weight loss and his walking.  We looked at his blood work and his glucose, triglycerides are high and HDL is 37.  I have told him this probably is due to the fact that he drinks quite a bit of regular Coke and drinks orange juice every day.  I have asked him to cut back on that.  Continue with his regular walking regimen and continue to watch his weight..  We did review the fact that his triglycerides have steadily gotten better.  He appears to have impaired fasting glucose.         Thank you for allowing me to participate in his care.     Vaughn Bello MD, [facility]    Today's clinic visit entailed:  Review of the result(s) of each unique test - echocardiogram, lab work, EKG  Ordering of each unique test  Prescription drug management  50 minutes spent by me on the date of the encounter doing chart review, history and exam, documentation and further activities per the note  Provider  Link to [facility] Help Grid     The level of medical decision making during this visit was of moderate complexity.      Orders Placed This Encounter   Procedures    Basic metabolic panel    Follow-Up with Cardiology BRENDAN    EKG 12-lead complete w/read - Clinics       Orders Placed This Encounter    Medications    lisinopril (ZESTRIL) 40 MG tablet     Sig: Take 1 tablet (40 mg) by mouth daily.     Dispense:  90 tablet     Refill:  3    metoprolol succinate ER (TOPROL XL) 25 MG 24 hr tablet     Sig: Take 1 tablet (25 mg) by mouth daily.     Dispense:  90 tablet     Refill:  3    hydroCHLOROthiazide 12.5 MG tablet     Sig: Take 1 tablet (12.5 mg) by mouth daily.     Dispense:  90 tablet     Refill:  4       Medications Discontinued During This Encounter   Medication Reason    ziprasidone (GEODON) 80 MG capsule     lisinopril (ZESTRIL) 20 MG tablet Reorder (No AVS)    metoprolol succinate ER (TOPROL XL) 25 MG 24 hr tablet Reorder (No AVS)         Encounter Diagnoses   Name Primary?    Benign essential hypertension     Paroxysmal atrial fibrillation (H)        CURRENT MEDICATIONS:  Current Outpatient Medications   Medication Sig Dispense Refill    acetaminophen 500 MG CAPS Take 1 capsule by mouth 3 times daily       Aspirin (ASPIR-81 PO) Take 1 tablet by mouth Take 3 times a week.  Monday, Wednesday, and Friday      hydroCHLOROthiazide 12.5 MG tablet Take 1 tablet (12.5 mg) by mouth daily. 90 tablet 4    IRON, FERROUS GLUCONATE, PO Take 324 mg by mouth daily       lisinopril (ZESTRIL) 40 MG tablet Take 1 tablet (40 mg) by mouth daily. 90 tablet 3    metoprolol succinate ER (TOPROL XL) 25 MG 24 hr tablet Take 1 tablet (25 mg) by mouth daily. 90 tablet 3       ALLERGIES   No Known Allergies    PAST MEDICAL HISTORY:  Past Medical History:   Diagnosis Date    Atrial fibrillation (aka AFIB)     s/p MAZE right and left, removal left atrial appendage 04/29/2011    Family history of early CAD     GERD (gastroesophageal reflux disease)     Hypertension     MVP (mitral valve prolapse)     s/p mitral valve repair, 32mm [place]. Mars ring 04/29/2011    Need for SBE (subacute bacterial endocarditis) prophylaxis     PFO (patent foramen ovale)     s/p PFO closure, 04/29/2011    Schizophrenia (H)     Sleep apnea        PAST SURGICAL  HISTORY:  Past Surgical History:   Procedure Laterality Date    OTHER SURGICAL HISTORY      s/p mitral valve repair, 32mm St[place] Mars ring 2011    OTHER SURGICAL HISTORY      s/p MAZE right and left, removal left atrial appendage 2011    OTHER SURGICAL HISTORY      s/p PFO closure, 2011    OTHER SURGICAL HISTORY      esophageal surgery for GERD     OTHER SURGICAL HISTORY      laproscopic Nissen fundoplication, 1998    TONSILLECTOMY         FAMILY HISTORY:  Family History   Problem Relation Age of Onset    Myocardial Infarction Mother     Hypertension Mother     Unknown/Adopted Mother     Myocardial Infarction Father        SOCIAL HISTORY:  Social History     Socioeconomic History    Marital status: Single     Spouse name: None    Number of children: None    Years of education: None    Highest education level: None   Tobacco Use    Smoking status: Former     Current packs/day: 0.00     Types: Cigarettes     Quit date: 1995     Years since quittin.    Smokeless tobacco: Never   Substance and Sexual Activity    Alcohol use: No    Drug use: No   Other Topics Concern    Parent/sibling w/ CABG, MI or angioplasty before 65F 55M? No    Caffeine Concern Yes     Comment: soda    Special Diet No    Exercise Yes     Comment: daily, walking    Seat Belt Yes       Review of Systems:  Skin:  Negative       Eyes:  Positive for glasses    ENT:  Negative      Respiratory:  Positive for sleep apnea GALE with stairs   Cardiovascular:  Negative;palpitations;chest pain;dizziness;syncope or near-syncope;cyanosis;lightheadedness;edema;fatigue      Gastroenterology: Positive for constipation    Genitourinary:  Negative      Musculoskeletal:  Negative      Neurologic:  Negative      Psychiatric:  Positive for sleep disturbances    Heme/Lymph/Imm:  Negative      Endocrine:  Negative        Physical Exam:  Vitals: BP (!) 143/90   Pulse 96   Ht 1.829 m (6')   Wt 92.9 kg (204 lb 11.2 oz)   SpO2 95%    BMI 27.76 kg/m      Constitutional:  cooperative, alert and oriented, well developed, well nourished, in no acute distress        Skin:  warm and dry to the touch, no apparent skin lesions or masses noted surgical scars well-healed        Head:  normocephalic, no masses or lesions        Eyes:  pupils equal and round, conjunctivae and lids unremarkable, sclera white, no xanthalasma, EOMS intact, no nystagmus        Lymph:      ENT:  no pallor or cyanosis poor dentition      Neck:  no carotid bruit        Respiratory:  normal breath sounds, clear to auscultation, normal A-P diameter, normal symmetry, normal respiratory excursion, no use of accessory muscles         Cardiac: regular rhythm occasional premature beats     systolic murmur;grade 2;LUSB        pulses full and equal                                        GI:           Extremities and Muscular Skeletal:  no spinal abnormalities noted;normal muscle strength and tone   bilateral LE edema;trace     wears compresson stockings    Neurological:  no gross motor deficits        Psych:  affect appropriate, oriented to time, person and place        CC  Jose Riley MD  [address] [place] [id number]

## 2024-08-21 NOTE — LETTER
8/21/2024    Jose Riley MD  [address] [address]    RE: Nicholas Gongora       Dear Colleague,     I had the pleasure of seeing Nicholas Gongora in the [facility].  HPI and Plan:    Nicholas is a very nice, 69-year-old gentleman with a past medical history significant for schizophrenia and mitral valve prolapse, ultimately undergoing a mitral valve repair with a [place]. Mars ring in 2001 by Dr. Eugene Mckeon. At that time, he had new onset atrial fibrillation, so in addition to his mitral valve repair with a 22 mm ring, he underwent a bilateral maze procedure, left atrial appendage removal and repair of a patent foramen ovale.     Socially, Mr. Gongora had problems with intermittent homelessness, but now has an [facility] in [place] [facility], which he has been in for years, and it has been a great help to stabilize his health care.     Nicholas returns to clinic stating that he is doing quite well.  He has no chest, arm, neck, jaw or shoulder discomfort.  He states he now gets short of breath when he carries Coke up a flight of stairs.  He was short of breath coming up our 54 stairs here to get to the clinic.  He has no orthopnea or PND.   No palpitations, lightheadedness, dizziness, syncope or near syncope.  No symptoms to suggest a TIA or CVA.  He notes no side effects with the current medical regimen.     ASSESSMENT AND PLAN:  Nicholas appears to be doing well from a cardiac standpoint without clinical evidence of ischemia.  Coronary angiography in 2011 describes normal coronary arteries.     Repeat echocardiogram 3/2023,  shows his mitral valve to be functioning well with a mean gradient of 3 at a heart rate of 58.  He has some mild MR and mild TR.  Ejection fraction is 55%-60%.  His left atrium is severely dilated.  He has a mild amount of mitral regurgitation.  There is a mild amount of tricuspid regurgitation.  Pulmonary pressures are  estimated to be 44 mmHg plus right atrial pressure.  IVC is described in the anterior immediate range.  This is up from 2022 at which time was 30 mmHg plus his right atrial pressure which again was described as in the intermediate range.  I will intensify his antihypertensive regiment at this time.  He is ordered for a follow-up in March.     His rhythm was irregular today and EKG demonstrates normal sinus rhythm with frequent PVCs.     Blood pressure is high today at 143/90.  My retake is also high.  I am going to increase his lisinopril to 20 mg daily and add hydrochlorothiazide 12.5 mg daily.  I will try to obtain springlike numbers given his rising pulmonary hypertension.  I will have him follow-up with my BRENDAN in one month with a BMP to see how his blood pressure is doing to make sure his electrolytes are okay with the change.     Weight is 204 pounds, down 5 pounds from last visit giving a body mass index of 27.8..  He states he walks quite regularly.  I congratulated on his weight loss and his walking.  We looked at his blood work and his glucose, triglycerides are high and HDL is 37.  I have told him this probably is due to the fact that he drinks quite a bit of regular Coke and drinks orange juice every day.  I have asked him to cut back on that.  Continue with his regular walking regimen and continue to watch his weight..  We did review the fact that his triglycerides have steadily gotten better.  He appears to have impaired fasting glucose.         Thank you for allowing me to participate in his care.     Vaughn Bello MD, [facility]    Today's clinic visit entailed:  Review of the result(s) of each unique test - echocardiogram, lab work, EKG  Ordering of each unique test  Prescription drug management  50 minutes spent by me on the date of the encounter doing chart review, history and exam, documentation and further activities per the note  Provider  Link to [facility] Help Grid     The level of medical  decision making during this visit was of moderate complexity.      Orders Placed This Encounter   Procedures     Basic metabolic panel     Follow-Up with Cardiology BRENDAN     EKG 12-lead complete w/read - Clinics       Orders Placed This Encounter   Medications     lisinopril (ZESTRIL) 40 MG tablet     Sig: Take 1 tablet (40 mg) by mouth daily.     Dispense:  90 tablet     Refill:  3     metoprolol succinate ER (TOPROL XL) 25 MG 24 hr tablet     Sig: Take 1 tablet (25 mg) by mouth daily.     Dispense:  90 tablet     Refill:  3     hydroCHLOROthiazide 12.5 MG tablet     Sig: Take 1 tablet (12.5 mg) by mouth daily.     Dispense:  90 tablet     Refill:  4       Medications Discontinued During This Encounter   Medication Reason     ziprasidone (GEODON) 80 MG capsule      lisinopril (ZESTRIL) 20 MG tablet Reorder (No AVS)     metoprolol succinate ER (TOPROL XL) 25 MG 24 hr tablet Reorder (No AVS)         Encounter Diagnoses   Name Primary?     Benign essential hypertension      Paroxysmal atrial fibrillation (H)        CURRENT MEDICATIONS:  Current Outpatient Medications   Medication Sig Dispense Refill     acetaminophen 500 MG CAPS Take 1 capsule by mouth 3 times daily        Aspirin (ASPIR-81 PO) Take 1 tablet by mouth Take 3 times a week.  Monday, Wednesday, and Friday       hydroCHLOROthiazide 12.5 MG tablet Take 1 tablet (12.5 mg) by mouth daily. 90 tablet 4     IRON, FERROUS GLUCONATE, PO Take 324 mg by mouth daily        lisinopril (ZESTRIL) 40 MG tablet Take 1 tablet (40 mg) by mouth daily. 90 tablet 3     metoprolol succinate ER (TOPROL XL) 25 MG 24 hr tablet Take 1 tablet (25 mg) by mouth daily. 90 tablet 3       ALLERGIES   No Known Allergies    PAST MEDICAL HISTORY:  Past Medical History:   Diagnosis Date     Atrial fibrillation (aka AFIB)     s/p MAZE right and left, removal left atrial appendage 04/29/2011     Family history of early CAD      GERD (gastroesophageal reflux disease)      Hypertension      MVP  (mitral valve prolapse)     s/p mitral valve repair, 32mm St. Mars ring 2011     Need for SBE (subacute bacterial endocarditis) prophylaxis      PFO (patent foramen ovale)     s/p PFO closure, 2011     Schizophrenia (H)      Sleep apnea        PAST SURGICAL HISTORY:  Past Surgical History:   Procedure Laterality Date     OTHER SURGICAL HISTORY      s/p mitral valve repair, 32mm St[place] Mars ring 2011     OTHER SURGICAL HISTORY      s/p MAZE right and left, removal left atrial appendage 2011     OTHER SURGICAL HISTORY      s/p PFO closure, 2011     OTHER SURGICAL HISTORY      esophageal surgery for GERD      OTHER SURGICAL HISTORY      laproscopic Nissen fundoplication,      TONSILLECTOMY         FAMILY HISTORY:  Family History   Problem Relation Age of Onset     Myocardial Infarction Mother      Hypertension Mother      Unknown/Adopted Mother      Myocardial Infarction Father        SOCIAL HISTORY:  Social History     Socioeconomic History     Marital status: Single     Spouse name: None     Number of children: None     Years of education: None     Highest education level: None   Tobacco Use     Smoking status: Former     Current packs/day: 0.00     Types: Cigarettes     Quit date: 1995     Years since quittin.5     Smokeless tobacco: Never   Substance and Sexual Activity     Alcohol use: No     Drug use: No   Other Topics Concern     Parent/sibling w/ CABG, MI or angioplasty before 65F 55M? No     Caffeine Concern Yes     Comment: soda     Special Diet No     Exercise Yes     Comment: daily, walking     Seat Belt Yes       Review of Systems:  Skin:  Negative       Eyes:  Positive for glasses    ENT:  Negative      Respiratory:  Positive for sleep apnea GALE with stairs   Cardiovascular:  Negative;palpitations;chest pain;dizziness;syncope or near-syncope;cyanosis;lightheadedness;edema;fatigue      Gastroenterology: Positive for constipation    Genitourinary:   Negative      Musculoskeletal:  Negative      Neurologic:  Negative      Psychiatric:  Positive for sleep disturbances    Heme/Lymph/Imm:  Negative      Endocrine:  Negative        Physical Exam:  Vitals: BP (!) 143/90   Pulse 96   Ht 1.829 m (6')   Wt 92.9 kg (204 lb 11.2 oz)   SpO2 95%   BMI 27.76 kg/m      Constitutional:  cooperative, alert and oriented, well developed, well nourished, in no acute distress        Skin:  warm and dry to the touch, no apparent skin lesions or masses noted surgical scars well-healed        Head:  normocephalic, no masses or lesions        Eyes:  pupils equal and round, conjunctivae and lids unremarkable, sclera white, no xanthalasma, EOMS intact, no nystagmus        Lymph:      ENT:  no pallor or cyanosis poor dentition      Neck:  no carotid bruit        Respiratory:  normal breath sounds, clear to auscultation, normal A-P diameter, normal symmetry, normal respiratory excursion, no use of accessory muscles         Cardiac: regular rhythm occasional premature beats     systolic murmur;grade 2;LUSB        pulses full and equal                                        GI:           Extremities and Muscular Skeletal:  no spinal abnormalities noted;normal muscle strength and tone   bilateral LE edema;trace     wears compresson stockings    Neurological:  no gross motor deficits        Psych:  affect appropriate, oriented to time, person and place        CC  Jose Riley MD  [address] [place] [address]                  Thank you for allowing me to participate in the care of your patient.      Sincerely,     Vaughn Bello MD     [facility]  cc:   Jose Riley MD  [address] [place], [place] [id number]

## 2024-10-07 ENCOUNTER — LAB (OUTPATIENT)
Dept: LAB | Facility: CLINIC | Age: 69
End: 2024-10-07
Payer: COMMERCIAL

## 2024-10-07 DIAGNOSIS — I10 BENIGN ESSENTIAL HYPERTENSION: ICD-10-CM

## 2024-10-07 LAB
ANION GAP SERPL CALCULATED.3IONS-SCNC: 11 MMOL/L (ref 7–15)
BUN SERPL-MCNC: 14.6 MG/DL (ref 8–23)
CALCIUM SERPL-MCNC: 9.5 MG/DL (ref 8.8–10.4)
CHLORIDE SERPL-SCNC: 102 MMOL/L (ref 98–107)
CREAT SERPL-MCNC: 1.1 MG/DL (ref 0.67–1.17)
EGFRCR SERPLBLD CKD-EPI 2021: 72 ML/MIN/1.73M2
GLUCOSE SERPL-MCNC: 104 MG/DL (ref 70–99)
HCO3 SERPL-SCNC: 27 MMOL/L (ref 22–29)
POTASSIUM SERPL-SCNC: 3.9 MMOL/L (ref 3.4–5.3)
SODIUM SERPL-SCNC: 140 MMOL/L (ref 135–145)

## 2024-10-07 PROCEDURE — 80048 BASIC METABOLIC PNL TOTAL CA: CPT

## 2024-10-07 PROCEDURE — 36415 COLL VENOUS BLD VENIPUNCTURE: CPT

## 2024-10-09 ENCOUNTER — TELEPHONE (OUTPATIENT)
Dept: CARDIOLOGY | Facility: CLINIC | Age: 69
End: 2024-10-09

## 2024-10-09 DIAGNOSIS — I05.9 MITRAL VALVE DISORDER: Primary | ICD-10-CM

## 2024-10-09 DIAGNOSIS — I77.810 ASCENDING AORTA DILATATION (H): ICD-10-CM

## 2024-10-09 NOTE — TELEPHONE ENCOUNTER
Patient has a history of mitral valve repair. Last echo was in March, 2023.    Will message Dr. Bello to review

## 2024-10-09 NOTE — TELEPHONE ENCOUNTER
[facility] Call Center    Phone Message    May a detailed message be left on voicemail: yes     Reason for Call: Other: Nicholas called to schedule an echo for March per Dr. Bello's request, but there is no order present.  Made him aware he was due for an office visit around the beginning of March, but he insists it's an echo he needs.  Please review, order echo if necessary, and call Nicholas back.     Action Taken: Other: Cardiology    Travel Screening: Not Applicable     Thank you!  [facility]

## 2024-10-10 NOTE — TELEPHONE ENCOUNTER
Reply from Dr. Bello:  Yes.  He needs a follow-up echo I probably forgot to put it in.  His valve is looking fine but he seems to be developing pulmonary hypertension.     Order placed for pre-visit echo in March, 2025  Left patient a detailed message that the order has been updated. Left the contact information for the [facility] so he can call back and schedule.

## 2024-11-13 NOTE — PROGRESS NOTES
~Cardiology Clinic Visit~    Nicholas Gongora MRN# [id number]   YOB: 1955 Age: 69 year old   Primary Cardiologist: Dr. Bello           Assessment and Plan:   Nicholas Gongora is a very pleasant 69 year old male who is here today for follow-up on blood pressure medication changes    Hypertension  Controlled  Continue hydrochlorothiazide 12.5 mg daily, lisinopril 40 mg daily, metoprolol XL 25 mg daily    Mitral valve prolapse  status post mitral valve repair with a [place] Mars ring in 2001 by Dr. Eugene Mckeon  Echo 3/2023-mean mitral valve gradient is 3 mmHg     Atrial fibrillation  Status post bilateral maze procedure, and left atrial appendage ligation in 2001  Currently not on anticoagulation    Patent foramen ovale   Repaired in 2001      Plan:   -No medication changes  -Annual echo scheduled for March 2025      Follow up with Dr. Bello in Spring 2025 with echo and labs prior       Matilda Laws PA-C  Physician Assistant   [facility]  Pager: [phone]          History of Presenting Illness:    Nicholas Gongora is a very pleasant 69 year old male with a history of schizophrenia, mitral valve prolapse (status post mitral valve repair with a [place]. Mars ring in 2001 by Dr. Eugene Mckeon), atrial fibrillation (bilateral maze procedure, and left atrial appendage ligation 2001), HTN, and patent foramen ovale (repaired in 2001).     In brief, coronary angiography was done in 2011 demonstrating normal coronary arteries.  Echocardiogram from March 2023 showed his mitral valve to be functioning well with a mean gradient of 3 mmHg at a heart rate of 58.  He has some mild MR and mild TR.  Ejection fraction was 55-60%.    He was last seen in clinic in August 2024.  Blood pressure noted to be suboptimal.  His lisinopril was increased to 40 mg daily and he was started on hydrochlorothiazide 12.5 mg daily.  He was to follow-up in 1 month.    Patient has not been checking his blood pressure at  home regularly.  He has a wrist blood pressure cuff and is concerned that it may not be accurate.  He does report that for the last year he has become more winded when he exerts himself up 2 flights of stairs when he is carrying his groceries.  His car recently broke down which has encouraged him to increase his walking as he has become more dependent on taking the bus and walking.    Denies orthopnea and PND. Denies chest pain, palpitations, lightheadedness, dizziness, near syncope and syncope.     Taking medications daily as prescribed.      Blood pressure 130/76 and HR 65 in clinic today.     BMP from 10/2024 reviewed.  Electrolytes and kidney function all within normal limits.        Social History       Social History     Socioeconomic History    Marital status: Single     Spouse name: Not on file    Number of children: Not on file    Years of education: Not on file    Highest education level: Not on file   Occupational History    Not on file   Tobacco Use    Smoking status: Former     Current packs/day: 0.00     Types: Cigarettes     Quit date: 1995     Years since quittin.7    Smokeless tobacco: Never   Substance and Sexual Activity    Alcohol use: No    Drug use: No    Sexual activity: Not on file   Other Topics Concern    Parent/sibling w/ CABG, MI or angioplasty before 65F 55M? No     Service Not Asked    Blood Transfusions Not Asked    Caffeine Concern Yes     Comment: soda    Occupational Exposure Not Asked    Hobby Hazards Not Asked    Sleep Concern Not Asked    Stress Concern Not Asked    Weight Concern Not Asked    Special Diet No    Back Care Not Asked    Exercise Yes     Comment: daily, walking    Bike Helmet Not Asked    Seat Belt Yes    Self-Exams Not Asked   Social History Narrative    Not on file     Social Drivers of Health     Financial Resource Strain: Not on file   Food Insecurity: Not on file   Transportation Needs: Not on file   Physical Activity: Not on file   Stress:  Not on file   Social Connections: Not on file   Interpersonal Safety: Not on file   Housing Stability: Not on file            Review of Systems:   Please see HPI         Physical Exam:   Vitals: /76   Pulse 65   Ht 1.829 m (6')   Wt 93.6 kg (206 lb 6.4 oz)   SpO2 97%   BMI 27.99 kg/m     Wt Readings from Last 4 Encounters:   08/21/24 92.9 kg (204 lb 11.2 oz)   03/07/23 95.1 kg (209 lb 11.2 oz)   03/15/22 89.4 kg (197 lb)   03/23/21 93.2 kg (205 lb 6.4 oz)     GEN: well nourished, in no acute distress.  NECK: Supple. JVP was not appreciated.  C/V:  Regular rate and rhythm, no murmur, rub or gallop.    RESP: Respirations are unlabored. Clear to auscultation bilaterally without wheezing, rales, or rhonchi.  EXTREM: Bilateral lower extremities with no edema.   SKIN: Warm and dry.        Data:   LIPID RESULTS:  Lab Results   Component Value Date    CHOL 181 08/16/2024    CHOL 172 03/23/2021    HDL 37 (L) 08/16/2024    HDL 37 (L) 03/23/2021    LDL 98 08/16/2024    LDL 76 03/23/2021    TRIG 230 (H) 08/16/2024    TRIG 246 (H) 09/28/2022    TRIG 296 (H) 03/23/2021     LIVER ENZYME RESULTS:  Lab Results   Component Value Date    AST 16 10/07/2020    ALT 17 08/16/2024    ALT 15 10/07/2020     CBC RESULTS:  Lab Results   Component Value Date    WBC 8.9 10/07/2020    RBC 4.81 10/07/2020    HGB 14.3 10/07/2020    HCT 44.1 10/07/2020    MCV 91.7 10/07/2020    MCH 29.7 10/07/2020    MCHC 32.4 10/07/2020    RDW 12.9 10/07/2020     10/07/2020     BMP RESULTS:  Lab Results   Component Value Date     10/07/2024     03/23/2021    POTASSIUM 3.9 10/07/2024    POTASSIUM 3.8 03/15/2022    POTASSIUM 4.0 03/23/2021    CHLORIDE 102 10/07/2024    CHLORIDE 107 09/28/2022    CHLORIDE 107 03/23/2021    CO2 27 10/07/2024    CO2 30 03/15/2022    CO2 29 03/23/2021    ANIONGAP 11 10/07/2024    ANIONGAP 3 03/15/2022    ANIONGAP 4 03/23/2021     (H) 10/07/2024     (H) 03/15/2022     (H) 03/23/2021     BUN 14.6 10/07/2024    BUN 13 03/15/2022    BUN 11 03/23/2021    CR 1.10 10/07/2024    CR 1.11 03/23/2021    GFRESTIMATED 72 10/07/2024    GFRESTIMATED 68 03/23/2021    GFRESTBLACK 79 03/23/2021    COLE 9.5 10/07/2024    COLE 9.4 03/23/2021      A1C RESULTS:  Lab Results   Component Value Date    A1C 5.5 04/30/2011     INR RESULTS:  Lab Results   Component Value Date    INR 1.42 (H) 05/04/2011    INR 1.13 05/03/2011            Medications     Current Outpatient Medications   Medication Sig Dispense Refill    acetaminophen 500 MG CAPS Take 1 capsule by mouth 3 times daily       Aspirin (ASPIR-81 PO) Take 1 tablet by mouth Take 3 times a week.  Monday, Wednesday, and Friday      hydroCHLOROthiazide 12.5 MG tablet Take 1 tablet (12.5 mg) by mouth daily. 90 tablet 4    IRON, FERROUS GLUCONATE, PO Take 324 mg by mouth daily       lisinopril (ZESTRIL) 40 MG tablet Take 1 tablet (40 mg) by mouth daily. 90 tablet 3    metoprolol succinate ER (TOPROL XL) 25 MG 24 hr tablet Take 1 tablet (25 mg) by mouth daily. 90 tablet 3          Past Medical History     Past Medical History:   Diagnosis Date    Atrial fibrillation (aka AFIB)     s/p MAZE right and left, removal left atrial appendage 04/29/2011    Family history of early CAD     GERD (gastroesophageal reflux disease)     Hypertension     MVP (mitral valve prolapse)     s/p mitral valve repair, 32mm St. Mars ring 04/29/2011    Need for SBE (subacute bacterial endocarditis) prophylaxis     PFO (patent foramen ovale)     s/p PFO closure, 04/29/2011    Schizophrenia (H)     Sleep apnea      Past Surgical History:   Procedure Laterality Date    OTHER SURGICAL HISTORY  2011    s/p mitral valve repair, 32mm St. Mars ring 04/29/2011    OTHER SURGICAL HISTORY  2011    s/p MAZE right and left, removal left atrial appendage 04/29/2011    OTHER SURGICAL HISTORY  2011    s/p PFO closure, 04/29/2011    OTHER SURGICAL HISTORY      esophageal surgery for GERD 1998    OTHER SURGICAL HISTORY       laproscopic Nissen fundoplication, 1998    TONSILLECTOMY       Family History   Problem Relation Age of Onset    Myocardial Infarction Mother     Hypertension Mother     Unknown/Adopted Mother     Myocardial Infarction Father             Allergies   Patient has no known allergies.      This note was completed in part using dictation via the Dragon voice recognition software. Some word and grammatical errors may occur and must be interpreted in the appropriate clinical context.  If there are any questions pertaining to this issue, please contact me for further clarification.

## 2024-11-18 ENCOUNTER — OFFICE VISIT (OUTPATIENT)
Dept: CARDIOLOGY | Facility: CLINIC | Age: 69
End: 2024-11-18
Attending: INTERNAL MEDICINE
Payer: COMMERCIAL

## 2024-11-18 VITALS
DIASTOLIC BLOOD PRESSURE: 76 MMHG | SYSTOLIC BLOOD PRESSURE: 130 MMHG | WEIGHT: 206.4 LBS | BODY MASS INDEX: 27.96 KG/M2 | HEART RATE: 65 BPM | OXYGEN SATURATION: 97 % | HEIGHT: 72 IN

## 2024-11-18 DIAGNOSIS — I10 BENIGN ESSENTIAL HYPERTENSION: ICD-10-CM

## 2024-11-18 PROCEDURE — 99214 OFFICE O/P EST MOD 30 MIN: CPT

## 2024-11-18 NOTE — PATIENT INSTRUCTIONS
Thank you for your visit with the [facility] today.    Today's plan:   Medication changes: none   Check your blood pressure at home. If the top numbers remains >130, please call and let the clinic know.   Follow up with Dr. Bello in Spring 2025           If you have questions or concerns please call the nurse team at [phone] or send a Passbox message.     Scheduling phone number: [phone]    It was a pleasure seeing you today!     Matilda Laws PA-C   Physician Assistant   [facility]

## 2024-11-18 NOTE — LETTER
11/18/2024    Jose Riley MD  [address] [address]    RE: Nicholas Gongora       Dear Colleague,     I had the pleasure of seeing Nicholas Gongora in the [facility].              ~Cardiology Clinic Visit~    Nicholas Gongora MRN# [id number]   YOB: 1955 Age: 69 year old   Primary Cardiologist: Dr. Bello           Assessment and Plan:   Nicholas Gongora is a very pleasant 69 year old male who is here today for follow-up on blood pressure medication changes    Hypertension  Controlled  Continue hydrochlorothiazide 12.5 mg daily, lisinopril 40 mg daily, metoprolol XL 25 mg daily    Mitral valve prolapse  status post mitral valve repair with a [place]. Mars ring in 2001 by Dr. Eugene Mckeon  Echo 3/2023-mean mitral valve gradient is 3 mmHg     Atrial fibrillation  Status post bilateral maze procedure, and left atrial appendage ligation in 2001  Currently not on anticoagulation    Patent foramen ovale   Repaired in 2001      Plan:   -No medication changes  -Annual echo scheduled for March 2025      Follow up with Dr. Bello in Spring 2025 with echo and labs prior       Matilda Laws PA-C  Physician Assistant   [facility]  Pager: [phone]          History of Presenting Illness:    Nicholas Gongora is a very pleasant 69 year old male with a history of schizophrenia, mitral valve prolapse (status post mitral valve repair with a St. Mars ring in 2001 by Dr. Eugene Mckeon), atrial fibrillation (bilateral maze procedure, and left atrial appendage ligation 2001), HTN, and patent foramen ovale (repaired in 2001).     In brief, coronary angiography was done in 2011 demonstrating normal coronary arteries.  Echocardiogram from March 2023 showed his mitral valve to be functioning well with a mean gradient of 3 mmHg at a heart rate of 58.  He has some mild MR and mild TR.  Ejection fraction was 55-60%.    He was last seen in clinic in  2024.  Blood pressure noted to be suboptimal.  His lisinopril was increased to 40 mg daily and he was started on hydrochlorothiazide 12.5 mg daily.  He was to follow-up in 1 month.    Patient has not been checking his blood pressure at home regularly.  He has a wrist blood pressure cuff and is concerned that it may not be accurate.  He does report that for the last year he has become more winded when he exerts himself up 2 flights of stairs when he is carrying his groceries.  His car recently broke down which has encouraged him to increase his walking as he has become more dependent on taking the bus and walking.    Denies orthopnea and PND. Denies chest pain, palpitations, lightheadedness, dizziness, near syncope and syncope.     Taking medications daily as prescribed.      Blood pressure 130/76 and HR 65 in clinic today.     BMP from 10/2024 reviewed.  Electrolytes and kidney function all within normal limits.        Social History       Social History     Socioeconomic History     Marital status: Single     Spouse name: Not on file     Number of children: Not on file     Years of education: Not on file     Highest education level: Not on file   Occupational History     Not on file   Tobacco Use     Smoking status: Former     Current packs/day: 0.00     Types: Cigarettes     Quit date: 1995     Years since quittin.7     Smokeless tobacco: Never   Substance and Sexual Activity     Alcohol use: No     Drug use: No     Sexual activity: Not on file   Other Topics Concern     Parent/sibling w/ CABG, MI or angioplasty before 65F 55M? No      Service Not Asked     Blood Transfusions Not Asked     Caffeine Concern Yes     Comment: soda     Occupational Exposure Not Asked     Hobby Hazards Not Asked     Sleep Concern Not Asked     Stress Concern Not Asked     Weight Concern Not Asked     Special Diet No     Back Care Not Asked     Exercise Yes     Comment: daily, walking     Bike Helmet Not Asked      Seat Belt Yes     Self-Exams Not Asked   Social History Narrative     Not on file     Social Drivers of Health     Financial Resource Strain: Not on file   Food Insecurity: Not on file   Transportation Needs: Not on file   Physical Activity: Not on file   Stress: Not on file   Social Connections: Not on file   Interpersonal Safety: Not on file   Housing Stability: Not on file            Review of Systems:   Please see HPI         Physical Exam:   Vitals: /76   Pulse 65   Ht 1.829 m (6')   Wt 93.6 kg (206 lb 6.4 oz)   SpO2 97%   BMI 27.99 kg/m     Wt Readings from Last 4 Encounters:   08/21/24 92.9 kg (204 lb 11.2 oz)   03/07/23 95.1 kg (209 lb 11.2 oz)   03/15/22 89.4 kg (197 lb)   03/23/21 93.2 kg (205 lb 6.4 oz)     GEN: well nourished, in no acute distress.  NECK: Supple. JVP was not appreciated.  C/V:  Regular rate and rhythm, no murmur, rub or gallop.    RESP: Respirations are unlabored. Clear to auscultation bilaterally without wheezing, rales, or rhonchi.  EXTREM: Bilateral lower extremities with no edema.   SKIN: Warm and dry.        Data:   LIPID RESULTS:  Lab Results   Component Value Date    CHOL 181 08/16/2024    CHOL 172 03/23/2021    HDL 37 (L) 08/16/2024    HDL 37 (L) 03/23/2021    LDL 98 08/16/2024    LDL 76 03/23/2021    TRIG 230 (H) 08/16/2024    TRIG 246 (H) 09/28/2022    TRIG 296 (H) 03/23/2021     LIVER ENZYME RESULTS:  Lab Results   Component Value Date    AST 16 10/07/2020    ALT 17 08/16/2024    ALT 15 10/07/2020     CBC RESULTS:  Lab Results   Component Value Date    WBC 8.9 10/07/2020    RBC 4.81 10/07/2020    HGB 14.3 10/07/2020    HCT 44.1 10/07/2020    MCV 91.7 10/07/2020    MCH 29.7 10/07/2020    MCHC 32.4 10/07/2020    RDW 12.9 10/07/2020     10/07/2020     BMP RESULTS:  Lab Results   Component Value Date     10/07/2024     03/23/2021    POTASSIUM 3.9 10/07/2024    POTASSIUM 3.8 03/15/2022    POTASSIUM 4.0 03/23/2021    CHLORIDE 102 10/07/2024     CHLORIDE 107 09/28/2022    CHLORIDE 107 03/23/2021    CO2 27 10/07/2024    CO2 30 03/15/2022    CO2 29 03/23/2021    ANIONGAP 11 10/07/2024    ANIONGAP 3 03/15/2022    ANIONGAP 4 03/23/2021     (H) 10/07/2024     (H) 03/15/2022     (H) 03/23/2021    BUN 14.6 10/07/2024    BUN 13 03/15/2022    BUN 11 03/23/2021    CR 1.10 10/07/2024    CR 1.11 03/23/2021    GFRESTIMATED 72 10/07/2024    GFRESTIMATED 68 03/23/2021    GFRESTBLACK 79 03/23/2021    COLE 9.5 10/07/2024    COLE 9.4 03/23/2021      A1C RESULTS:  Lab Results   Component Value Date    A1C 5.5 04/30/2011     INR RESULTS:  Lab Results   Component Value Date    INR 1.42 (H) 05/04/2011    INR 1.13 05/03/2011            Medications     Current Outpatient Medications   Medication Sig Dispense Refill     acetaminophen 500 MG CAPS Take 1 capsule by mouth 3 times daily        Aspirin (ASPIR-81 PO) Take 1 tablet by mouth Take 3 times a week.  Monday, Wednesday, and Friday       hydroCHLOROthiazide 12.5 MG tablet Take 1 tablet (12.5 mg) by mouth daily. 90 tablet 4     IRON, FERROUS GLUCONATE, PO Take 324 mg by mouth daily        lisinopril (ZESTRIL) 40 MG tablet Take 1 tablet (40 mg) by mouth daily. 90 tablet 3     metoprolol succinate ER (TOPROL XL) 25 MG 24 hr tablet Take 1 tablet (25 mg) by mouth daily. 90 tablet 3          Past Medical History     Past Medical History:   Diagnosis Date     Atrial fibrillation (aka AFIB)     s/p MAZE right and left, removal left atrial appendage 04/29/2011     Family history of early CAD      GERD (gastroesophageal reflux disease)      Hypertension      MVP (mitral valve prolapse)     s/p mitral valve repair, 32mm St. Mars ring 04/29/2011     Need for SBE (subacute bacterial endocarditis) prophylaxis      PFO (patent foramen ovale)     s/p PFO closure, 04/29/2011     Schizophrenia (H)      Sleep apnea      Past Surgical History:   Procedure Laterality Date     OTHER SURGICAL HISTORY  2011    s/p mitral valve  repair, 32mm St. Mars ring 04/29/2011     OTHER SURGICAL HISTORY  2011    s/p MAZE right and left, removal left atrial appendage 04/29/2011     OTHER SURGICAL HISTORY  2011    s/p PFO closure, 04/29/2011     OTHER SURGICAL HISTORY      esophageal surgery for GERD 1998     OTHER SURGICAL HISTORY      laproscopic Nissen fundoplication, 1998     TONSILLECTOMY       Family History   Problem Relation Age of Onset     Myocardial Infarction Mother      Hypertension Mother      Unknown/Adopted Mother      Myocardial Infarction Father             Allergies   Patient has no known allergies.      This note was completed in part using dictation via the Dragon voice recognition software. Some word and grammatical errors may occur and must be interpreted in the appropriate clinical context.  If there are any questions pertaining to this issue, please contact me for further clarification.      Thank you for allowing me to participate in the care of your patient.      Sincerely,     Matilda Laws PA-C     [facility]  cc:   Vaughn Bello MD  [id number] [place] [id number]

## 2025-03-03 ENCOUNTER — TELEPHONE (OUTPATIENT)
Dept: CARDIOLOGY | Facility: CLINIC | Age: 70
End: 2025-03-03

## 2025-03-03 ENCOUNTER — LAB (OUTPATIENT)
Dept: LAB | Facility: CLINIC | Age: 70
End: 2025-03-03
Payer: COMMERCIAL

## 2025-03-03 ENCOUNTER — HOSPITAL ENCOUNTER (OUTPATIENT)
Dept: CARDIOLOGY | Facility: CLINIC | Age: 70
Discharge: HOME | End: 2025-03-03
Attending: INTERNAL MEDICINE | Admitting: INTERNAL MEDICINE
Payer: COMMERCIAL

## 2025-03-03 DIAGNOSIS — I05.9 MITRAL VALVE DISORDER: ICD-10-CM

## 2025-03-03 DIAGNOSIS — I77.810 ASCENDING AORTA DILATATION: ICD-10-CM

## 2025-03-03 DIAGNOSIS — I10 BENIGN ESSENTIAL HYPERTENSION: ICD-10-CM

## 2025-03-03 LAB
ALT SERPL W P-5'-P-CCNC: 23 U/L (ref 0–70)
ANION GAP SERPL CALCULATED.3IONS-SCNC: 10 MMOL/L (ref 7–15)
BUN SERPL-MCNC: 17.7 MG/DL (ref 8–23)
CALCIUM SERPL-MCNC: 9.9 MG/DL (ref 8.8–10.4)
CHLORIDE SERPL-SCNC: 102 MMOL/L (ref 98–107)
CHOLEST SERPL-MCNC: 179 MG/DL
CREAT SERPL-MCNC: 1.16 MG/DL (ref 0.67–1.17)
EGFRCR SERPLBLD CKD-EPI 2021: 67 ML/MIN/1.73M2
FASTING STATUS PATIENT QL REPORTED: YES
GLUCOSE SERPL-MCNC: 102 MG/DL (ref 70–99)
HCO3 SERPL-SCNC: 28 MMOL/L (ref 22–29)
HDLC SERPL-MCNC: 32 MG/DL
LDLC SERPL CALC-MCNC: 93 MG/DL
LVEF ECHO: NORMAL
NONHDLC SERPL-MCNC: 147 MG/DL
POTASSIUM SERPL-SCNC: 4.2 MMOL/L (ref 3.4–5.3)
SODIUM SERPL-SCNC: 140 MMOL/L (ref 135–145)
TRIGL SERPL-MCNC: 272 MG/DL

## 2025-03-03 PROCEDURE — 93306 TTE W/DOPPLER COMPLETE: CPT | Mod: 26 | Performed by: INTERNAL MEDICINE

## 2025-03-03 PROCEDURE — 36415 COLL VENOUS BLD VENIPUNCTURE: CPT

## 2025-03-03 PROCEDURE — 84460 ALANINE AMINO (ALT) (SGPT): CPT

## 2025-03-03 PROCEDURE — 80048 BASIC METABOLIC PNL TOTAL CA: CPT

## 2025-03-03 PROCEDURE — 93306 TTE W/DOPPLER COMPLETE: CPT

## 2025-03-03 PROCEDURE — 80061 LIPID PANEL: CPT

## 2025-03-03 NOTE — LETTER
2025       TO: Teto Gongora   [address]  [address]  [facility] [address]       [facility], Mr. Gongora,    Thank you for completing your testing. Dr. Bello reviewed the results and said: Triglycerides are high but cholesterol overall good enough for primary prevention.  Cut down on pop, juice and simple carbohydrates.  Exercise daily.  Echo is okay.     Your appointment with Dr. Bello is in [place] on 2025 @ 10:30    Results for orders placed or performed during the hospital encounter of 25   Echocardiogram Complete     Status: None   Result Value Ref Range    LVEF  55%     Narrative    [id number]  [id number]  [id number]  [id number]^VICENTE^MAIRA^ADDIE     [facility]  [facility]  Echocardiography Laboratory  [address]  [place] [address]  Phone [phone]  Fax [phone]     Name: TETO GONGORA  MRN: [id number]  : 1955  Study Date: 2025 10:53 AM  Age: 70 yrs  Gender: Male  Patient Location: [facility] [address]  Reason For Study: Mitral valve disorder, Ascending aorta dilatation  Ordering Physician: MAIRA BELLO  Referring Physician: Jose Riley  Performed By: Mita Moya RDCS     BSA: 2.2 m2  Height: 72 in  Weight: 206 lb  HR: 107  BP: 122/71 mmHg  ______________________________________________________________________________  Procedure  Echocardiogram with two-dimensional, color and spectral Doppler. Technically  difficult study.     ______________________________________________________________________________  Interpretation Summary     1. Normal left ventricular size and systolic function. Estimated ejection  fraction is 55%.  2. Abnormal ventricular septal motion - postoperative.  3. Right ventricle not well visualized. Grossly normal right ventricular size  and systolic function.  4. Estimated right ventricular systolic pressure is 42 mmHg.  5. Moderate left atrial enlargement.  6. Status post mitral valve  annuloplasty/repair (2001). Mitral valve mean  diastolic gradient is 5 mmHg (HR 73bpm). Trivial mitral regurgitation.  7. No pericardial effusion.  8. No significant change compared to prior exam from 3/7/2023.     COMMENTS: Frequent PVCs noted during echocardiogram. There was [id number]  variability in EF related to PVCs.  ______________________________________________________________________________  Left Ventricle  The left ventricle is normal in size. There is normal left ventricular wall  thickness. Left ventricular systolic function is normal. The visual ejection  fraction is estimated at 55%. Left ventricular diastolic function is  indeterminate. Septal motion is consistent with post-operative state.     Right Ventricle  Right ventricle not well visualized. Grossly normal right ventricular size and  systolic function.     Atria  The left atrium is moderately dilated. Right atrial size is normal.     Mitral Valve  Status post mitral valve annuloplasty/repair (2001). Mitral valve mean  diastolic gradient is 5 mmHg (HR 73bpm). Trivial mitral regurgitation.     Tricuspid Valve  The tricuspid valve is not well visualized, but is grossly normal. There is  mild (1+) tricuspid regurgitation.     Aortic Valve  The aortic valve is trileaflet. There is trace aortic regurgitation. No aortic  stenosis is present.     Pulmonic Valve  The pulmonic valve is normal in structure and function. There is mild (1+)  pulmonic valvular regurgitation.     Vessels  The aortic root is normal size. Normal size ascending aorta. Mildly dilated  inferior vena cava with normal inspiratory collapse.     Pericardium  There is no pericardial effusion.     Rhythm  The rhythm was atrial fibrillation.     ______________________________________________________________________________  MMode/2D Measurements & Calculations  IVSd: 1.1 cm  LVIDd: 4.9 cm  LVIDs: 3.5 cm  LVPWd: 1.1 cm  FS: 28.6 %  LV mass(C)d: 201.3 grams  LV mass(C)dI: 93.3  grams/m2  Ao root diam: 3.8 cm  LA dimension: 5.2 cm     asc Aorta Diam: 3.8 cm  LA/Ao: 1.4  Ao root diam index Ht(cm/m): 2.1  Ao root diam index BSA (cm/m2): 1.8  Asc Ao diam index BSA (cm/m2): 1.8  Asc Ao diam index Ht(cm/m): 2.1  EF Biplane: 55.4 %  LA Volume (BP): 100.0 ml  LA Volume Index (BP): 46.3 ml/m2  RV Base: 4.9 cm     RWT: 0.44  TAPSE: 2.0 cm     Doppler Measurements & Calculations  MV E max erich: 102.0 cm/sec  MV A max erich: 35.6 cm/sec  MV E/A: 2.9  MV max PG: 10.4 mmHg  MV mean P.2 mmHg  MV V2 VTI: 34.7 cm  MV dec time: 0.14 sec  PA acc time: 0.08 sec  PI end-d erich: 153.0 cm/sec  TR max erich: 291.0 cm/sec  TR max P.9 mmHg  RVSP(TR): 41.9 mmHg  E/E' av.4  Lateral E/e': 9.0  Medial E/e': 15.8  RV S Erich: 10.2 cm/sec     ______________________________________________________________________________  Report approved by: Javi Mei MD on 2025 11:55 AM         Results for orders placed or performed in visit on 25   Basic metabolic panel     Status: Abnormal   Result Value Ref Range    Sodium 140 135 - 145 mmol/L    Potassium 4.2 3.4 - 5.3 mmol/L    Chloride 102 98 - 107 mmol/L    Carbon Dioxide (CO2) 28 22 - 29 mmol/L    Anion Gap 10 7 - 15 mmol/L    Urea Nitrogen 17.7 8.0 - 23.0 mg/dL    Creatinine 1.16 0.67 - 1.17 mg/dL    GFR Estimate 67 >60 mL/min/1.73m2    Calcium 9.9 8.8 - 10.4 mg/dL    Glucose 102 (H) 70 - 99 mg/dL   ALT     Status: Normal   Result Value Ref Range    ALT 23 0 - 70 U/L   Lipid panel reflex to direct LDL Fasting     Status: Abnormal   Result Value Ref Range    Cholesterol 179 <200 mg/dL    Triglycerides 272 (H) <150 mg/dL    Direct Measure HDL 32 (L) >=40 mg/dL    LDL Cholesterol Calculated 93 <100 mg/dL    Non HDL Cholesterol 147 (H) <130 mg/dL    Patient Fasting > 8hrs? Yes     Narrative    Cholesterol  Desirable: < 200 mg/dL  Borderline High: 200 - 239 mg/dL  High: >= 240 mg/dL    Triglycerides  Normal: < 150 mg/dL  Borderline High: 150 - 199 mg/dL  High:  200-499 mg/dL  Very High: >= 500 mg/dL    Direct Measure HDL  Female: >= 50 mg/dL   Male: >= 40 mg/dL    LDL Cholesterol  Desirable: < 100 mg/dL  Above Desirable: 100 - 129 mg/dL   Borderline High: 130 - 159 mg/dL   High:  160 - 189 mg/dL   Very High: >= 190 mg/dL    Non HDL Cholesterol  Desirable: < 130 mg/dL  Above Desirable: 130 - 159 mg/dL  Borderline High: 160 - 189 mg/dL  High: 190 - 219 mg/dL  Very High: >= 220 mg/dL     Thank you  Team 2 R.N.s  [phone]  [facility]

## 2025-03-03 NOTE — TELEPHONE ENCOUNTER
Echo and labs routed to Dr Bello for preliminary review, follow up is not until 4/25/25. Hx mitral valve disease. Not on a statin, normal coronaries per angiogram 2011.     Interpretation Summary  1. Normal left ventricular size and systolic function. Estimated ejection  fraction is 55%.  2. Abnormal ventricular septal motion - postoperative.  3. Right ventricle not well visualized. Grossly normal right ventricular size  and systolic function.  4. Estimated right ventricular systolic pressure is 42 mmHg.  5. Moderate left atrial enlargement.  6. Status post mitral valve annuloplasty/repair (2001). Mitral valve mean  diastolic gradient is 5 mmHg (HR 73bpm). Trivial mitral regurgitation.  7. No pericardial effusion.  8. No significant change compared to prior exam from 3/7/2023.  COMMENTS: Frequent PVCs noted during echocardiogram. There was [id number]  variability in EF related to PVCs.  [place]: Dr Mei       Component      Latest Ref Rng 8/16/2024  7:03 AM 3/3/2025  9:06 AM   Sodium      135 - 145 mmol/L 139  140    Potassium      3.4 - 5.3 mmol/L 3.9  4.2    Chloride      98 - 107 mmol/L 103  102    Carbon Dioxide (CO2)      22 - 29 mmol/L 26  28    Anion Gap      7 - 15 mmol/L 10  10    Urea Nitrogen      8.0 - 23.0 mg/dL 11.9  17.7    Creatinine      0.67 - 1.17 mg/dL 1.02  1.16    GFR Estimate      >60 mL/min/1.73m2 79  67    Calcium      8.8 - 10.4 mg/dL 9.6  9.9    Glucose      70 - 99 mg/dL 101 (H)  102 (H)    Patient Fasting? No  Yes    Patient Fasting? No     Cholesterol      <200 mg/dL 181  179    Triglycerides      <150 mg/dL 230 (H)  272 (H)    HDL Cholesterol      >=40 mg/dL 37 (L)  32 (L)    LDL Cholesterol Calculated      <100 mg/dL 98  93    Non HDL Cholesterol      <130 mg/dL 144 (H)  147 (H)    ALT      0 - 70 U/L 17  23

## 2025-03-04 NOTE — TELEPHONE ENCOUNTER
Reply from Dr. Bello:  Triglycerides are high but cholesterol overall good enough for primary prevention.  Cut down on pop, juice and simple carbohydrates.  Exercise daily.  Echo is okay.     Results letter sent to patient:    Ira, Mr. Gongora,    Thank you for completing your testing. Dr. Bello reviewed the results and said: Triglycerides are high but cholesterol overall good enough for primary prevention.  Cut down on pop, juice and simple carbohydrates.  Exercise daily.  Echo is okay.     Your appointment with Dr. Bello is in [place] on 4/25/2025 @ 10:30.    Thank you  Team 2 R.N.s  [phone]

## 2025-04-25 ENCOUNTER — TELEPHONE (OUTPATIENT)
Dept: CARDIOLOGY | Facility: CLINIC | Age: 70
End: 2025-04-25

## 2025-04-25 PROBLEM — E78.2 MIXED HYPERCHOLESTEROLEMIA AND HYPERTRIGLYCERIDEMIA: Status: ACTIVE | Noted: 2021-03-23

## 2025-04-25 PROBLEM — R73.01 IMPAIRED FASTING GLUCOSE: Status: ACTIVE | Noted: 2025-04-25

## 2025-04-25 PROBLEM — I47.29 NSVT (NONSUSTAINED VENTRICULAR TACHYCARDIA) (H): Status: ACTIVE | Noted: 2025-04-25

## 2025-04-25 NOTE — TELEPHONE ENCOUNTER
----- Message from Vaughn Bello sent at 4/25/2025 11:21 AM CDT -----  Mr. Gongora is a very nice gentleman who unfortunately has schizophrenia.  He was homeless for quite a while now lives [facility] in [place].  He lost his car in the last year so it takes 3 bus transfers to get here.  I want to change his hydrochlorothiazide to spironolactone and would like to check blood work in 2 weeks.  His usual clinic is not on a bus line and too far to walk.  I was hoping maybe you or a  could figure out where he can get his BMP checked in 2 weeks conveniently, whether that is a different [place] or [facility] clinic in his area, walking distance or along the bus line.  Thanks.  Cheyanne.      Chart reviewed, [facility] clinic is 5.2mi away from patient's home. Called patient to see if it would be possible for him to get labs there, left a message to call back.     [facility]-  [address], [place] [address]  Ph [phone]

## 2025-04-28 NOTE — TELEPHONE ENCOUNTER
Second attempt at reaching patient. No answer, detailed voicemail left informing patient that Dr. Bello would like labs drawn to call back regarding what clinic would be easiest for patient to access.     Shonda Snowden RN  [facility]  [phone]

## 2025-04-29 NOTE — TELEPHONE ENCOUNTER
Spoke with patient. He has picked up the spironolactone and started that in place of the hydrochlorothiazide.   Patient scheduled  himself at the [facility] clinic on 5/16/2025 for the follow up bmp.

## 2025-04-29 NOTE — TELEPHONE ENCOUNTER
Return call from JUAN Jo at Dr. Riley's clinic. Deysi reports patient has not been seen in clinic since 2019.

## 2025-04-29 NOTE — TELEPHONE ENCOUNTER
3rd attempted to reach patient to discuss finding a lab near his home for future medication changes. Left a message for patient to call back.    Left a message with PCP clinic, Dr. Riley, to see if they have any information regarding a  for the patient. Left a message for call back if they have any updates.    [facility] Kjersti Zaragcza for housing and mental health evaluations.    Awaiting call back from patient      1100 attempted to contact [facility]'s [facility] team @ [phone], no answer after 20 minutes on hold.  Request sent by Fax yp [phone] to see if their program can help

## 2025-05-16 ENCOUNTER — RESULTS FOLLOW-UP (OUTPATIENT)
Dept: CARDIOLOGY | Facility: CLINIC | Age: 70
End: 2025-05-16

## 2025-08-12 DIAGNOSIS — I48.0 PAROXYSMAL ATRIAL FIBRILLATION (H): ICD-10-CM

## 2025-08-12 DIAGNOSIS — I10 BENIGN ESSENTIAL HYPERTENSION: ICD-10-CM

## 2025-08-12 RX ORDER — LISINOPRIL 40 MG/1
40 TABLET ORAL DAILY
Qty: 90 TABLET | Refills: 3 | Status: SHIPPED | OUTPATIENT
Start: 2025-08-12

## 2025-08-12 RX ORDER — METOPROLOL SUCCINATE 25 MG/1
25 TABLET, EXTENDED RELEASE ORAL DAILY
Qty: 90 TABLET | Refills: 3 | Status: SHIPPED | OUTPATIENT
Start: 2025-08-12